# Patient Record
Sex: MALE | NOT HISPANIC OR LATINO | Employment: UNEMPLOYED | URBAN - METROPOLITAN AREA
[De-identification: names, ages, dates, MRNs, and addresses within clinical notes are randomized per-mention and may not be internally consistent; named-entity substitution may affect disease eponyms.]

---

## 2018-11-14 ENCOUNTER — HOSPITAL ENCOUNTER (EMERGENCY)
Facility: HOSPITAL | Age: 38
DRG: 134 | End: 2018-11-14
Attending: EMERGENCY MEDICINE
Payer: COMMERCIAL

## 2018-11-14 ENCOUNTER — APPOINTMENT (EMERGENCY)
Dept: CT IMAGING | Facility: HOSPITAL | Age: 38
DRG: 134 | End: 2018-11-14
Payer: COMMERCIAL

## 2018-11-14 ENCOUNTER — HOSPITAL ENCOUNTER (INPATIENT)
Facility: HOSPITAL | Age: 38
LOS: 1 days | Discharge: HOME | DRG: 134 | End: 2018-11-15
Attending: INTERNAL MEDICINE | Admitting: INTERNAL MEDICINE
Payer: COMMERCIAL

## 2018-11-14 VITALS
HEART RATE: 111 BPM | RESPIRATION RATE: 17 BRPM | TEMPERATURE: 98.9 F | SYSTOLIC BLOOD PRESSURE: 128 MMHG | BODY MASS INDEX: 25.69 KG/M2 | HEIGHT: 71 IN | WEIGHT: 183.5 LBS | DIASTOLIC BLOOD PRESSURE: 64 MMHG | OXYGEN SATURATION: 90 %

## 2018-11-14 DIAGNOSIS — I26.99 PULMONARY EMBOLISM (HCC): Primary | ICD-10-CM

## 2018-11-14 DIAGNOSIS — J18.9 PNEUMONIA OF RIGHT LOWER LOBE DUE TO INFECTIOUS ORGANISM: ICD-10-CM

## 2018-11-14 DIAGNOSIS — I26.99 OTHER ACUTE PULMONARY EMBOLISM WITHOUT ACUTE COR PULMONALE (HCC): ICD-10-CM

## 2018-11-14 DIAGNOSIS — I26.99 PULMONARY EMBOLI (HCC): Primary | ICD-10-CM

## 2018-11-14 PROBLEM — Z72.0 TOBACCO ABUSE: Chronic | Status: ACTIVE | Noted: 2018-11-14

## 2018-11-14 LAB
ALBUMIN SERPL BCP-MCNC: 5 G/DL (ref 3.5–5.7)
ALP SERPL-CCNC: 79 U/L (ref 40–150)
ALT SERPL W P-5'-P-CCNC: 22 U/L (ref 7–52)
ANION GAP SERPL CALCULATED.3IONS-SCNC: 7 MMOL/L (ref 4–13)
AST SERPL W P-5'-P-CCNC: 25 U/L (ref 13–39)
BASOPHILS # BLD AUTO: 0 THOUSANDS/ΜL (ref 0–0.1)
BASOPHILS NFR BLD AUTO: 0 % (ref 0–1)
BILIRUB SERPL-MCNC: 0.5 MG/DL (ref 0.2–1)
BILIRUB UR QL STRIP: NEGATIVE
BUN SERPL-MCNC: 7 MG/DL (ref 7–25)
CALCIUM SERPL-MCNC: 10 MG/DL (ref 8.6–10.5)
CHLORIDE SERPL-SCNC: 100 MMOL/L (ref 98–107)
CLARITY UR: CLEAR
CO2 SERPL-SCNC: 29 MMOL/L (ref 21–31)
COLOR UR: YELLOW
CREAT SERPL-MCNC: 1.04 MG/DL (ref 0.7–1.3)
EOSINOPHIL # BLD AUTO: 0.3 THOUSAND/ΜL (ref 0–0.61)
EOSINOPHIL NFR BLD AUTO: 2 % (ref 0–6)
ERYTHROCYTE [DISTWIDTH] IN BLOOD BY AUTOMATED COUNT: 14.1 % (ref 11.6–15.1)
GFR SERPL CREATININE-BSD FRML MDRD: 91 ML/MIN/1.73SQ M
GLUCOSE SERPL-MCNC: 97 MG/DL (ref 65–140)
GLUCOSE UR STRIP-MCNC: NEGATIVE MG/DL
HCT VFR BLD AUTO: 44.1 % (ref 42–52)
HGB BLD-MCNC: 14.6 G/DL (ref 12–17)
HGB UR QL STRIP.AUTO: NEGATIVE
KETONES UR STRIP-MCNC: ABNORMAL MG/DL
LACTATE SERPL-SCNC: 0.6 MMOL/L (ref 0.5–2)
LEUKOCYTE ESTERASE UR QL STRIP: NEGATIVE
LIPASE SERPL-CCNC: 37 U/L (ref 11–82)
LYMPHOCYTES # BLD AUTO: 2.3 THOUSANDS/ΜL (ref 0.6–4.47)
LYMPHOCYTES NFR BLD AUTO: 18 % (ref 14–44)
MCH RBC QN AUTO: 30.1 PG (ref 26.8–34.3)
MCHC RBC AUTO-ENTMCNC: 33.1 G/DL (ref 31.4–37.4)
MCV RBC AUTO: 91 FL (ref 82–98)
MONOCYTES # BLD AUTO: 1.1 THOUSAND/ΜL (ref 0.17–1.22)
MONOCYTES NFR BLD AUTO: 9 % (ref 4–12)
NEUTROPHILS # BLD AUTO: 8.8 THOUSANDS/ΜL (ref 1.85–7.62)
NEUTS SEG NFR BLD AUTO: 71 % (ref 43–75)
NITRITE UR QL STRIP: NEGATIVE
NRBC BLD AUTO-RTO: 0 /100 WBCS
PH UR STRIP.AUTO: 6.5 [PH] (ref 5–8)
PLATELET # BLD AUTO: 253 THOUSANDS/UL (ref 149–390)
PMV BLD AUTO: 8 FL (ref 8.9–12.7)
POTASSIUM SERPL-SCNC: 4.2 MMOL/L (ref 3.5–5.5)
PROT SERPL-MCNC: 7.9 G/DL (ref 6.4–8.9)
PROT UR STRIP-MCNC: NEGATIVE MG/DL
RBC # BLD AUTO: 4.85 MILLION/UL (ref 3.88–5.62)
SODIUM SERPL-SCNC: 136 MMOL/L (ref 134–143)
SP GR UR STRIP.AUTO: 1.01 (ref 1–1.03)
TROPONIN I SERPL-MCNC: <0.03 NG/ML
UROBILINOGEN UR QL STRIP.AUTO: 0.2 E.U./DL
WBC # BLD AUTO: 12.5 THOUSAND/UL (ref 4.31–10.16)

## 2018-11-14 PROCEDURE — 87040 BLOOD CULTURE FOR BACTERIA: CPT

## 2018-11-14 PROCEDURE — 36415 COLL VENOUS BLD VENIPUNCTURE: CPT

## 2018-11-14 PROCEDURE — 96376 TX/PRO/DX INJ SAME DRUG ADON: CPT

## 2018-11-14 PROCEDURE — 96361 HYDRATE IV INFUSION ADD-ON: CPT

## 2018-11-14 PROCEDURE — 84484 ASSAY OF TROPONIN QUANT: CPT | Performed by: EMERGENCY MEDICINE

## 2018-11-14 PROCEDURE — 85025 COMPLETE CBC W/AUTO DIFF WBC: CPT | Performed by: EMERGENCY MEDICINE

## 2018-11-14 PROCEDURE — 74176 CT ABD & PELVIS W/O CONTRAST: CPT

## 2018-11-14 PROCEDURE — 99223 1ST HOSP IP/OBS HIGH 75: CPT | Performed by: PHYSICIAN ASSISTANT

## 2018-11-14 PROCEDURE — 96372 THER/PROPH/DIAG INJ SC/IM: CPT

## 2018-11-14 PROCEDURE — 93005 ELECTROCARDIOGRAM TRACING: CPT

## 2018-11-14 PROCEDURE — 96365 THER/PROPH/DIAG IV INF INIT: CPT

## 2018-11-14 PROCEDURE — 80053 COMPREHEN METABOLIC PANEL: CPT | Performed by: EMERGENCY MEDICINE

## 2018-11-14 PROCEDURE — 83690 ASSAY OF LIPASE: CPT | Performed by: EMERGENCY MEDICINE

## 2018-11-14 PROCEDURE — 81003 URINALYSIS AUTO W/O SCOPE: CPT | Performed by: EMERGENCY MEDICINE

## 2018-11-14 PROCEDURE — 96367 TX/PROPH/DG ADDL SEQ IV INF: CPT

## 2018-11-14 PROCEDURE — 99285 EMERGENCY DEPT VISIT HI MDM: CPT

## 2018-11-14 PROCEDURE — 36415 COLL VENOUS BLD VENIPUNCTURE: CPT | Performed by: EMERGENCY MEDICINE

## 2018-11-14 PROCEDURE — 96375 TX/PRO/DX INJ NEW DRUG ADDON: CPT

## 2018-11-14 PROCEDURE — 83605 ASSAY OF LACTIC ACID: CPT

## 2018-11-14 PROCEDURE — 71275 CT ANGIOGRAPHY CHEST: CPT

## 2018-11-14 RX ORDER — FENTANYL CITRATE 50 UG/ML
50 INJECTION, SOLUTION INTRAMUSCULAR; INTRAVENOUS ONCE
Status: COMPLETED | OUTPATIENT
Start: 2018-11-14 | End: 2018-11-14

## 2018-11-14 RX ORDER — OLANZAPINE 5 MG/1
5 TABLET ORAL
COMMUNITY

## 2018-11-14 RX ORDER — NICOTINE 21 MG/24HR
21 PATCH, TRANSDERMAL 24 HOURS TRANSDERMAL
Status: DISCONTINUED | OUTPATIENT
Start: 2018-11-14 | End: 2018-11-15 | Stop reason: HOSPADM

## 2018-11-14 RX ORDER — 0.9 % SODIUM CHLORIDE 0.9 %
3 VIAL (ML) INJECTION AS NEEDED
Status: DISCONTINUED | OUTPATIENT
Start: 2018-11-14 | End: 2018-11-14 | Stop reason: HOSPADM

## 2018-11-14 RX ORDER — TRAZODONE HYDROCHLORIDE 50 MG/1
50 TABLET ORAL
COMMUNITY

## 2018-11-14 RX ORDER — HYDROMORPHONE HCL/PF 1 MG/ML
1 SYRINGE (ML) INJECTION
Status: DISCONTINUED | OUTPATIENT
Start: 2018-11-14 | End: 2018-11-15

## 2018-11-14 RX ORDER — TRAZODONE HYDROCHLORIDE 50 MG/1
50 TABLET ORAL
Status: DISCONTINUED | OUTPATIENT
Start: 2018-11-14 | End: 2018-11-15 | Stop reason: HOSPADM

## 2018-11-14 RX ORDER — OXYCODONE HYDROCHLORIDE AND ACETAMINOPHEN 5; 325 MG/1; MG/1
2 TABLET ORAL EVERY 4 HOURS PRN
Status: DISCONTINUED | OUTPATIENT
Start: 2018-11-14 | End: 2018-11-15 | Stop reason: HOSPADM

## 2018-11-14 RX ORDER — ONDANSETRON 2 MG/ML
4 INJECTION INTRAMUSCULAR; INTRAVENOUS EVERY 6 HOURS PRN
Status: DISCONTINUED | OUTPATIENT
Start: 2018-11-14 | End: 2018-11-15 | Stop reason: HOSPADM

## 2018-11-14 RX ORDER — ACETAMINOPHEN 325 MG/1
650 TABLET ORAL EVERY 4 HOURS PRN
Status: DISCONTINUED | OUTPATIENT
Start: 2018-11-14 | End: 2018-11-15 | Stop reason: HOSPADM

## 2018-11-14 RX ORDER — OLANZAPINE 5 MG/1
5 TABLET ORAL
Status: DISCONTINUED | OUTPATIENT
Start: 2018-11-14 | End: 2018-11-15 | Stop reason: HOSPADM

## 2018-11-14 RX ORDER — ASPIRIN 81 MG/1
324 TABLET, CHEWABLE ORAL ONCE
Status: COMPLETED | OUTPATIENT
Start: 2018-11-14 | End: 2018-11-14

## 2018-11-14 RX ORDER — HYDROMORPHONE HCL/PF 1 MG/ML
1 SYRINGE (ML) INJECTION ONCE
Status: COMPLETED | OUTPATIENT
Start: 2018-11-14 | End: 2018-11-14

## 2018-11-14 RX ORDER — NICOTINE 21 MG/24HR
1 PATCH, TRANSDERMAL 24 HOURS TRANSDERMAL DAILY
Status: DISCONTINUED | OUTPATIENT
Start: 2018-11-15 | End: 2018-11-14

## 2018-11-14 RX ORDER — ONDANSETRON 2 MG/ML
4 INJECTION INTRAMUSCULAR; INTRAVENOUS ONCE
Status: COMPLETED | OUTPATIENT
Start: 2018-11-14 | End: 2018-11-14

## 2018-11-14 RX ADMIN — ONDANSETRON 4 MG: 2 INJECTION INTRAMUSCULAR; INTRAVENOUS at 17:37

## 2018-11-14 RX ADMIN — FENTANYL CITRATE 50 MCG: 50 INJECTION INTRAMUSCULAR; INTRAVENOUS at 19:20

## 2018-11-14 RX ADMIN — ENOXAPARIN SODIUM 80 MG: 100 INJECTION SUBCUTANEOUS at 20:12

## 2018-11-14 RX ADMIN — ASPIRIN 81 MG 324 MG: 81 TABLET ORAL at 17:17

## 2018-11-14 RX ADMIN — HYDROMORPHONE HYDROCHLORIDE 1 MG: 1 INJECTION, SOLUTION INTRAMUSCULAR; INTRAVENOUS; SUBCUTANEOUS at 21:23

## 2018-11-14 RX ADMIN — MORPHINE SULFATE 2 MG: 2 INJECTION, SOLUTION INTRAMUSCULAR; INTRAVENOUS at 17:19

## 2018-11-14 RX ADMIN — SODIUM CHLORIDE 1000 ML: 0.9 INJECTION, SOLUTION INTRAVENOUS at 17:37

## 2018-11-14 RX ADMIN — IOHEXOL 80 ML: 350 INJECTION, SOLUTION INTRAVENOUS at 18:49

## 2018-11-14 RX ADMIN — AZITHROMYCIN MONOHYDRATE 500 MG: 500 INJECTION, POWDER, LYOPHILIZED, FOR SOLUTION INTRAVENOUS at 20:56

## 2018-11-14 RX ADMIN — HYDROMORPHONE HYDROCHLORIDE 1 MG: 1 INJECTION, SOLUTION INTRAMUSCULAR; INTRAVENOUS; SUBCUTANEOUS at 17:37

## 2018-11-14 RX ADMIN — MORPHINE SULFATE 2 MG: 2 INJECTION, SOLUTION INTRAMUSCULAR; INTRAVENOUS at 22:56

## 2018-11-14 RX ADMIN — CEFTRIAXONE 1000 MG: 1 INJECTION, SOLUTION INTRAVENOUS at 20:12

## 2018-11-14 NOTE — ED NOTES
Patient resting quietly upright at his request due to position of comfort - patient made aware awaiting pending CT results     Sil Chinchilla RN  11/14/18 3450

## 2018-11-14 NOTE — ED PROVIDER NOTES
History  Chief Complaint   Patient presents with    Fever - 9 weeks to 74 years     last night     Flank Pain     started last night in the right flank, radiating into the right side of the chest       Patient reports to the emergency department with complaint of severe right-sided pain from the flank down to the right anterior chest and into the right abdomen  Patient's pain is greater than a 10/10 per patient  Patient had a fever starting yesterday intermittently and side pain starting late last night  History provided by:  Patient      Prior to Admission Medications   Prescriptions Last Dose Informant Patient Reported? Taking? OLANZapine (ZyPREXA) 5 mg tablet   Yes Yes   Sig: Take 5 mg by mouth daily at bedtime   traZODone (DESYREL) 100 mg tablet   Yes Yes   Sig: Take 50 mg by mouth daily at bedtime      Facility-Administered Medications: None       No past medical history on file  Past Surgical History:   Procedure Laterality Date    FACIAL RECONSTRUCTION SURGERY         No family history on file  I have reviewed and agree with the history as documented  Social History   Substance Use Topics    Smoking status: Current Every Day Smoker     Packs/day: 0 50    Smokeless tobacco: Never Used    Alcohol use No        Review of Systems   Constitutional: Negative for chills and fever  HENT: Negative for rhinorrhea and sore throat  Eyes: Negative for visual disturbance  Respiratory: Positive for chest tightness  Negative for cough and shortness of breath  Cardiovascular: Positive for chest pain  Negative for leg swelling  Gastrointestinal: Positive for abdominal pain  Negative for diarrhea, nausea and vomiting  Genitourinary: Negative for dysuria  Musculoskeletal: Negative for back pain and myalgias  Skin: Negative for rash  Neurological: Negative for dizziness and headaches  Psychiatric/Behavioral: Negative for confusion     All other systems reviewed and are negative  Physical Exam  Physical Exam   Constitutional: He is oriented to person, place, and time  He appears well-developed and well-nourished  HENT:   Nose: Nose normal    Mouth/Throat: Oropharynx is clear and moist  No oropharyngeal exudate  Eyes: Pupils are equal, round, and reactive to light  Conjunctivae and EOM are normal  No scleral icterus  Neck: Normal range of motion  Neck supple  No JVD present  No tracheal deviation present  Cardiovascular: Normal rate, regular rhythm and normal heart sounds  No murmur heard  Pulmonary/Chest: Breath sounds normal  No respiratory distress  He has no wheezes  He has no rales  He exhibits tenderness  Patient taking quick and shallow breaths due to severe right sided pain with deep inspiration   Abdominal: Soft  Bowel sounds are normal  He exhibits no distension and no mass  There is tenderness  There is guarding  There is no rebound  Hypoactive bowel sounds: tenderness is to the right sided upper abdomen greater than right sided lower abdomen  Musculoskeletal: Normal range of motion  He exhibits no edema or tenderness  Chest wall tender with palpation on the right anterior and posterior  Neurological: He is alert and oriented to person, place, and time  No cranial nerve deficit or sensory deficit  He exhibits normal muscle tone  5/5 motor, nl sens   Skin: Skin is warm and dry  No rash noted  No erythema  No pallor  No dermatomal rash consistent with shingles   Psychiatric: He has a normal mood and affect  His behavior is normal    Nursing note and vitals reviewed        Vital Signs  ED Triage Vitals [11/14/18 1655]   Temperature Pulse Respirations Blood Pressure SpO2   99 4 °F (37 4 °C) 92 16 109/72 100 %      Temp Source Heart Rate Source Patient Position - Orthostatic VS BP Location FiO2 (%)   Tympanic Monitor Sitting Left arm --      Pain Score       Worst Possible Pain           Vitals:    11/14/18 1655 11/14/18 1757   BP: 109/72 123/73 Pulse: 92 98   Patient Position - Orthostatic VS: Sitting        Visual Acuity      ED Medications  Medications   sodium chloride (PF) 0 9 % injection 3 mL (not administered)   sodium chloride 0 9 % bolus 1,000 mL (1,000 mL Intravenous New Bag 11/14/18 1737)   iohexol (OMNIPAQUE) 350 MG/ML injection (SINGLE-DOSE) 80 mL (not administered)   morphine injection 2 mg (2 mg Intravenous Given 11/14/18 1719)   aspirin chewable tablet 324 mg (324 mg Oral Given 11/14/18 1717)   HYDROmorphone (DILAUDID) injection 1 mg (1 mg Intravenous Given 11/14/18 1737)   ondansetron (ZOFRAN) injection 4 mg (4 mg Intravenous Given 11/14/18 1737)       Diagnostic Studies  Results Reviewed     Procedure Component Value Units Date/Time    CMP [[id number]] Collected:  11/14/18 1712    Lab Status:  Final result Specimen:  Blood from Arm, Left Updated:  11/14/18 1748     Sodium 136 mmol/L      Potassium 4 2 mmol/L      Chloride 100 mmol/L      CO2 29 mmol/L      ANION GAP 7 mmol/L      BUN 7 mg/dL      Creatinine 1 04 mg/dL      Glucose 97 mg/dL      Calcium 10 0 mg/dL      AST 25 U/L      ALT 22 U/L      Alkaline Phosphatase 79 U/L      Total Protein 7 9 g/dL      Albumin 5 0 g/dL      Total Bilirubin 0 50 mg/dL      eGFR 91 ml/min/1 73sq m     Narrative:         National Kidney Disease Education Program recommendations are as follows:  GFR calculation is accurate only with a steady state creatinine  Chronic Kidney disease less than 60 ml/min/1 73 sq  meters  Kidney failure less than 15 ml/min/1 73 sq  meters      Lipase [[id number]]  (Normal) Collected:  11/14/18 1712    Lab Status:  Final result Specimen:  Blood from Arm, Left Updated:  11/14/18 1748     Lipase 37 u/L     Troponin I [[id number]]  (Normal) Collected:  11/14/18 1712    Lab Status:  Final result Specimen:  Blood from Arm, Left Updated:  11/14/18 1748     Troponin I <0 03 ng/mL     CBC and differential [[id number]]  (Abnormal) Collected:  11/14/18 1712    Lab Status:  Final result Specimen:  Blood from Arm, Left Updated:  11/14/18 1733     WBC 12 50 (H) Thousand/uL      RBC 4 85 Million/uL      Hemoglobin 14 6 g/dL      Hematocrit 44 1 %      MCV 91 fL      MCH 30 1 pg      MCHC 33 1 g/dL      RDW 14 1 %      MPV 8 0 (L) fL      Platelets 089 Thousands/uL      nRBC 0 /100 WBCs      Neutrophils Relative 71 %      Lymphocytes Relative 18 %      Monocytes Relative 9 %      Eosinophils Relative 2 %      Basophils Relative 0 %      Neutrophils Absolute 8 80 (H) Thousands/µL      Lymphocytes Absolute 2 30 Thousands/µL      Monocytes Absolute 1 10 Thousand/µL      Eosinophils Absolute 0 30 Thousand/µL      Basophils Absolute 0 00 Thousands/µL     UA w Reflex to Microscopic w Reflex to Culture [[id number]]  (Abnormal) Collected:  11/14/18 1712    Lab Status:  Final result Specimen:  Urine from Urine, Clean Catch Updated:  11/14/18 1725     Color, UA Yellow     Clarity, UA Clear     Specific Gravity, UA 1 015     pH, UA 6 5     Leukocytes, UA Negative     Nitrite, UA Negative     Protein, UA Negative mg/dl      Glucose, UA Negative mg/dl      Ketones, UA Trace (A) mg/dl      Urobilinogen, UA 0 2 E U /dl      Bilirubin, UA Negative     Blood, UA Negative                 CT renal stone study abdomen pelvis without contrast    (Results Pending)   CTA chest pe study    (Results Pending)              Procedures  Procedures       Phone Contacts  ED Phone Contact    ED Course  ED Course as of Nov 14 1849 Wed Nov 14, 2018   1719 Normal sinus rhythm with a rate of 85 beats per minute, normal axis, normal conduction, normal ST-T segments, no STEMI seen  ECG 12 lead   1841 CT images of the chest reviewed by me  No pneumothorax seen  We await radiologist's reading  Dr Yvonne Donovan will follow patient  Patient discussed with Dr Yvonne Donovan                                  [facility] Time    Disposition  Final diagnoses:   None     ED Disposition     None      Follow-up Information    None         Patient's Medications Discharge Prescriptions    No medications on file     No discharge procedures on file      ED Provider  Electronically Signed by           Cassie Valenzuela, DO  11/14/18 6576

## 2018-11-14 NOTE — ED PROVIDER NOTES
History  Chief Complaint   Patient presents with    Fever - 9 weeks to 74 years     last night     Flank Pain     started last night in the right flank, radiating into the right side of the chest       I took over the care of [facility] hospitals from Dr Manuel Abarca  Patient is a 55-year-old male who came to the emergency department due to pain on right chest radiating to the upper back and the right upper quadrant area accompanied by low-grade the fever today  The patient denies cough, but has shortness of breath  Patient admits smoking  History provided by:  Patient   used: No    Fever - 9 weeks to 74 years   Temp source:  Unable to specify  Severity:  Mild  Onset quality:  Gradual  Duration:  1 day  Timing:  Constant  Progression:  Worsening  Chronicity:  New  Relieved by:  Nothing  Worsened by:  Nothing  Ineffective treatments:  None tried  Associated symptoms: chest pain    Associated symptoms: no chills, no confusion, no congestion, no cough, no dysuria, no ear pain, no headaches, no myalgias, no nausea, no rash, no rhinorrhea, no somnolence, no sore throat and no vomiting    Chest pain:     Quality: aching      Severity:  Moderate    Onset quality:  Gradual    Duration:  1 day    Timing:  Constant    Progression:  Worsening    Chronicity:  New  Risk factors: no contaminated food, no contaminated water, no hx of cancer, no immunosuppression, no occupational exposure, no recent sickness, no recent travel and no sick contacts        Prior to Admission Medications   Prescriptions Last Dose Informant Patient Reported? Taking? OLANZapine (ZyPREXA) 5 mg tablet   Yes Yes   Sig: Take 5 mg by mouth daily at bedtime   traZODone (DESYREL) 100 mg tablet   Yes Yes   Sig: Take 50 mg by mouth daily at bedtime      Facility-Administered Medications: None       No past medical history on file      Past Surgical History:   Procedure Laterality Date    FACIAL RECONSTRUCTION SURGERY         No family history on file  I have reviewed and agree with the history as documented  Social History   Substance Use Topics    Smoking status: Current Every Day Smoker     Packs/day: 0 50    Smokeless tobacco: Never Used    Alcohol use No        Review of Systems   Constitutional: Positive for fever  Negative for chills  HENT: Negative for congestion, ear pain, rhinorrhea and sore throat  Eyes: Negative  Respiratory: Negative for cough  Cardiovascular: Positive for chest pain  Negative for palpitations and leg swelling  Gastrointestinal: Positive for abdominal pain  Negative for nausea and vomiting  Endocrine: Negative  Genitourinary: Negative  Negative for dysuria  Musculoskeletal: Negative for myalgias  Skin: Negative for rash  Allergic/Immunologic: Negative  Neurological: Negative for headaches  Hematological: Negative  Psychiatric/Behavioral: Negative for confusion  Physical Exam  Physical Exam   Constitutional: He is oriented to person, place, and time  He appears well-developed and well-nourished  No distress  HENT:   Head: Normocephalic and atraumatic  Right Ear: External ear normal    Left Ear: External ear normal    Nose: Nose normal    Mouth/Throat: Oropharynx is clear and moist  No oropharyngeal exudate  Eyes: Pupils are equal, round, and reactive to light  Conjunctivae and EOM are normal  Right eye exhibits no discharge  Left eye exhibits no discharge  No scleral icterus  Neck: Normal range of motion  Neck supple  No tracheal deviation present  No thyromegaly present  Cardiovascular: Normal rate, regular rhythm, normal heart sounds and intact distal pulses  Pulmonary/Chest: Effort normal and breath sounds normal  No respiratory distress  Abdominal: Soft  Bowel sounds are normal  He exhibits no distension  There is no tenderness  Musculoskeletal: Normal range of motion  He exhibits no edema, tenderness or deformity     Lymphadenopathy:     He has no cervical adenopathy  Neurological: He is alert and oriented to person, place, and time  No cranial nerve deficit  He exhibits normal muscle tone  Coordination normal    Skin: Skin is warm and dry  No rash noted  He is not diaphoretic  No erythema  No pallor  Psychiatric: He has a normal mood and affect  His behavior is normal  Judgment and thought content normal    Nursing note and vitals reviewed  Vital Signs  ED Triage Vitals [11/14/18 1655]   Temperature Pulse Respirations Blood Pressure SpO2   99 4 °F (37 4 °C) 92 16 109/72 100 %      Temp Source Heart Rate Source Patient Position - Orthostatic VS BP Location FiO2 (%)   Tympanic Monitor Sitting Left arm --      Pain Score       Worst Possible Pain           Vitals:    11/14/18 1655 11/14/18 1757   BP: 109/72 123/73   Pulse: 92 98   Patient Position - Orthostatic VS: Sitting        Visual Acuity      ED Medications  Medications   sodium chloride (PF) 0 9 % injection 3 mL (not administered)   enoxaparin (LOVENOX) subcutaneous injection 80 mg (not administered)   cefTRIAXone (ROCEPHIN) IVPB (premix) 1,000 mg (not administered)   azithromycin (ZITHROMAX) 500 mg in sodium chloride 0 9% 250mL IVPB 500 mg (not administered)   morphine injection 2 mg (2 mg Intravenous Given 11/14/18 1719)   aspirin chewable tablet 324 mg (324 mg Oral Given 11/14/18 1717)   HYDROmorphone (DILAUDID) injection 1 mg (1 mg Intravenous Given 11/14/18 1737)   ondansetron (ZOFRAN) injection 4 mg (4 mg Intravenous Given 11/14/18 1737)   sodium chloride 0 9 % bolus 1,000 mL (1,000 mL Intravenous New Bag 11/14/18 1737)   iohexol (OMNIPAQUE) 350 MG/ML injection (SINGLE-DOSE) 80 mL (80 mL Intravenous Given 11/14/18 1849)   fentanyl citrate (PF) 100 MCG/2ML 50 mcg (50 mcg Intravenous Given 11/14/18 1920)       Diagnostic Studies  Results Reviewed     Procedure Component Value Units Date/Time    Lactic acid, plasma [[id number]] Collected:  11/14/18 1954    Lab Status:   In process Specimen: Blood from Arm, Left Updated:  11/14/18 2000    Blood culture #1 [[id number]] Collected:  11/14/18 1953    Lab Status: In process Specimen:  Blood from Arm, Right Updated:  11/14/18 2000    Blood culture #2 [[id number]] Collected:  11/14/18 1954    Lab Status: In process Specimen:  Blood from Arm, Left Updated:  11/14/18 2000    Lactic acid, plasma [[id number]]     Lab Status:  No result Specimen:  Blood     CMP [[id number]] Collected:  11/14/18 1712    Lab Status:  Final result Specimen:  Blood from Arm, Left Updated:  11/14/18 1748     Sodium 136 mmol/L      Potassium 4 2 mmol/L      Chloride 100 mmol/L      CO2 29 mmol/L      ANION GAP 7 mmol/L      BUN 7 mg/dL      Creatinine 1 04 mg/dL      Glucose 97 mg/dL      Calcium 10 0 mg/dL      AST 25 U/L      ALT 22 U/L      Alkaline Phosphatase 79 U/L      Total Protein 7 9 g/dL      Albumin 5 0 g/dL      Total Bilirubin 0 50 mg/dL      eGFR 91 ml/min/1 73sq m     Narrative:         National Kidney Disease Education Program recommendations are as follows:  GFR calculation is accurate only with a steady state creatinine  Chronic Kidney disease less than 60 ml/min/1 73 sq  meters  Kidney failure less than 15 ml/min/1 73 sq  meters      Lipase [[id number]]  (Normal) Collected:  11/14/18 1712    Lab Status:  Final result Specimen:  Blood from Arm, Left Updated:  11/14/18 1748     Lipase 37 u/L     Troponin I [[id number]]  (Normal) Collected:  11/14/18 1712    Lab Status:  Final result Specimen:  Blood from Arm, Left Updated:  11/14/18 1748     Troponin I <0 03 ng/mL     CBC and differential [[id number]]  (Abnormal) Collected:  11/14/18 1712    Lab Status:  Final result Specimen:  Blood from Arm, Left Updated:  11/14/18 1733     WBC 12 50 (H) Thousand/uL      RBC 4 85 Million/uL      Hemoglobin 14 6 g/dL      Hematocrit 44 1 %      MCV 91 fL      MCH 30 1 pg      MCHC 33 1 g/dL      RDW 14 1 %      MPV 8 0 (L) fL      Platelets 765 Thousands/uL      nRBC 0 /100 WBCs Neutrophils Relative 71 %      Lymphocytes Relative 18 %      Monocytes Relative 9 %      Eosinophils Relative 2 %      Basophils Relative 0 %      Neutrophils Absolute 8 80 (H) Thousands/µL      Lymphocytes Absolute 2 30 Thousands/µL      Monocytes Absolute 1 10 Thousand/µL      Eosinophils Absolute 0 30 Thousand/µL      Basophils Absolute 0 00 Thousands/µL     UA w Reflex to Microscopic w Reflex to Culture [[id number]]  (Abnormal) Collected:  11/14/18 1712    Lab Status:  Final result Specimen:  Urine from Urine, Clean Catch Updated:  11/14/18 1725     Color, UA Yellow     Clarity, UA Clear     Specific Gravity, UA 1 015     pH, UA 6 5     Leukocytes, UA Negative     Nitrite, UA Negative     Protein, UA Negative mg/dl      Glucose, UA Negative mg/dl      Ketones, UA Trace (A) mg/dl      Urobilinogen, UA 0 2 E U /dl      Bilirubin, UA Negative     Blood, UA Negative                 CTA chest pe study   Final Result by María Santiago (11/14 1934)   1  Acute pulmonary emboli most pronounced within the lobar and segmental   pulmonary arteries supplying the right lower lobe with suspected small   pulmonary emboli also within the left lower lobe  No convincing CT   evidence of right heart strain  2   Right lower lobe consolidation, which may be infectious/inflammatory   in etiology as this consolidation lacks the typical peripheral   triangular-shaped consolidation often seen with pulmonary infarctions  3   Trace right pleural effusion  4   No evidence of acute inflammatory process in the abdomen or pelvis on   this noncontrast exam   No nephrolithiasis, hydronephrosis, or visualized   ureteral stone  NOTIFICATION:  The critical results of the study were discussed with, and   acknowledged by Dr Samantha Mirza by telephone on 11/14/2018 at 19:25                                   Signed by María Santiago MD      CT renal stone study abdomen pelvis without contrast   Final Result by María Santiago (11/14 1934)   1  Acute pulmonary emboli most pronounced within the lobar and segmental   pulmonary arteries supplying the right lower lobe with suspected small   pulmonary emboli also within the left lower lobe  No convincing CT   evidence of right heart strain  2   Right lower lobe consolidation, which may be infectious/inflammatory   in etiology as this consolidation lacks the typical peripheral   triangular-shaped consolidation often seen with pulmonary infarctions  3   Trace right pleural effusion  4   No evidence of acute inflammatory process in the abdomen or pelvis on   this noncontrast exam   No nephrolithiasis, hydronephrosis, or visualized   ureteral stone  NOTIFICATION:  The critical results of the study were discussed with, and   acknowledged by Dr Daniel Vora by telephone on 11/14/2018 at 19:25  Signed by Veronica Linares MD                 Procedures  ECG 12 Lead Documentation  Date/Time: 11/14/2018 6:59 PM  Performed by: Dewayne Hastings  Authorized by: Juan Manuel GLOVER     Indications / Diagnosis:  Fever chest pain  ECG reviewed by me, the ED Provider: yes    Patient location:  ED  Previous ECG:     Previous ECG:  Unavailable    Comparison to cardiac monitor: Yes    Interpretation:     Interpretation: normal    Rate:     ECG rate:  85 beats per minute    ECG rate assessment: normal    Rhythm:     Rhythm: sinus rhythm    Ectopy:     Ectopy: none    QRS:     QRS axis:  Normal    QRS intervals:  Normal  Conduction:     Conduction: normal    ST segments:     ST segments:  Normal  T waves:     T waves: normal             Phone Contacts  ED Phone Contact    ED Course  ED Course as of Nov 14 2004 Wed Nov 14, 2018 1930 Patient is resting comfortably on the stretcher  He remains alert and oriented to time, place and person  He is not in any distress    I have discussed with him the results of his CT angio of the chest which showed pulmonary embolism and the possible pneumonia  I have advised him that he will be admitted in the hospital and he agreed  1951 Case was discussed with Dr Driss Novoa, Hospitalist on call about plan for admission and he agreed  MDM  Number of Diagnoses or Management Options  Pneumonia of right lower lobe due to infectious organism [facility]): new and requires workup  Pulmonary embolism [facility]): new and requires workup     Amount and/or Complexity of Data Reviewed  Clinical lab tests: ordered and reviewed  Tests in the radiology section of CPT®: ordered and reviewed  Tests in the medicine section of CPT®: ordered and reviewed  Discussion of test results with the performing providers: yes  Decide to obtain previous medical records or to obtain history from someone other than the patient: yes  Review and summarize past medical records: yes  Discuss the patient with other providers: yes  Independent visualization of images, tracings, or specimens: yes    Risk of Complications, Morbidity, and/or Mortality  Presenting problems: moderate  Diagnostic procedures: moderate  Management options: moderate    Patient Progress  Patient progress: stable    CritCare Time    Disposition  Final diagnoses:   Pulmonary embolism ([facility] [address] )   Pneumonia of right lower lobe due to infectious organism [facility])     Time reflects when diagnosis was documented in both MDM as applicable and the Disposition within this note     Time User Action Codes Description Comment    11/14/2018  7:53 PM Iva Richardson Add [I26 99] Pulmonary embolism ([facility] [address] )     11/14/2018  7:53 PM Jamal Eller Add [J18 1] Pneumonia of right lower lobe due to infectious organism [facility])       ED Disposition     ED Disposition Condition Comment    Transfer to Another [address] should be transferred out to Av Hyde MD Documentation      Most Recent Value   Patient Condition  An emergency transfer is being made prior to stabilization due to the need for definitive care and the benefit of transfer outweighs the risk, Other (Include comment)_________________________________________   Reason for Transfer  -- [Direct admission]   Benefits of Transfer  Continuity of care   Risks of Transfer  Potential for delay in receiving treatment, Potential deterioration of medical condition, Loss of IV, Increased discomfort during transfer, Possible worsening of condition or death during transfer   Accepting Physician  Dr Naom Grant Name, [facility]  Provider Certification  General risk, such as traffic hazards, adverse weather conditions, rough terrain or turbulence, possible failure of equipment (including vehicle or aircraft), or consequences of actions of persons outside the control of the transport personnel, Unanticipated needs of medical equipment and personnel during transport, Risk of worsening condition, The possibility of a transport vehicle being unavailable      RN Documentation      Most [facility] Name, [address] [place], [place]   Transport Mode  Ambulance   Level of Care  Advanced life support   Copies of Medical Records Sent  History and Physical   Patient Belongings Disposition  Sent with patient      Follow-up Information    None         Patient's Medications   Discharge Prescriptions    No medications on file     No discharge procedures on file      ED Provider  Electronically Signed by           Praveen Mcdonough MD  11/14/18 2030

## 2018-11-14 NOTE — ED NOTES
[facility]     Sherman Sanchez RN  11/14/18 5764

## 2018-11-15 ENCOUNTER — APPOINTMENT (INPATIENT)
Dept: NON INVASIVE DIAGNOSTICS | Facility: HOSPITAL | Age: 38
DRG: 134 | End: 2018-11-15
Payer: COMMERCIAL

## 2018-11-15 ENCOUNTER — HOSPITAL ENCOUNTER (INPATIENT)
Facility: HOSPITAL | Age: 38
LOS: 3 days | Discharge: HOME | DRG: 134 | End: 2018-11-18
Attending: EMERGENCY MEDICINE | Admitting: INTERNAL MEDICINE
Payer: COMMERCIAL

## 2018-11-15 VITALS
WEIGHT: 183 LBS | HEART RATE: 94 BPM | TEMPERATURE: 97.1 F | RESPIRATION RATE: 18 BRPM | SYSTOLIC BLOOD PRESSURE: 109 MMHG | HEIGHT: 71 IN | BODY MASS INDEX: 25.62 KG/M2 | OXYGEN SATURATION: 95 % | DIASTOLIC BLOOD PRESSURE: 65 MMHG

## 2018-11-15 DIAGNOSIS — R09.02 HYPOXIA: ICD-10-CM

## 2018-11-15 DIAGNOSIS — J18.9 PNEUMONIA OF RIGHT LOWER LOBE DUE TO INFECTIOUS ORGANISM: ICD-10-CM

## 2018-11-15 DIAGNOSIS — I26.99 OTHER ACUTE PULMONARY EMBOLISM WITHOUT ACUTE COR PULMONALE (HCC): ICD-10-CM

## 2018-11-15 DIAGNOSIS — I26.99 PULMONARY EMBOLUS (HCC): Primary | ICD-10-CM

## 2018-11-15 PROBLEM — R06.89 ACUTE RESPIRATORY INSUFFICIENCY: Status: ACTIVE | Noted: 2018-11-15

## 2018-11-15 PROBLEM — A41.9 SEPSIS (HCC): Status: ACTIVE | Noted: 2018-11-15

## 2018-11-15 LAB
ANION GAP SERPL CALCULATED.3IONS-SCNC: 7 MMOL/L (ref 4–13)
ATRIAL RATE: 85 BPM
BUN SERPL-MCNC: 8 MG/DL (ref 7–25)
CALCIUM SERPL-MCNC: 9.7 MG/DL (ref 8.6–10.5)
CHLORIDE SERPL-SCNC: 101 MMOL/L (ref 98–107)
CO2 SERPL-SCNC: 30 MMOL/L (ref 21–31)
CREAT SERPL-MCNC: 1.09 MG/DL (ref 0.7–1.3)
ERYTHROCYTE [DISTWIDTH] IN BLOOD BY AUTOMATED COUNT: 13.8 % (ref 11.5–14.5)
GFR SERPL CREATININE-BSD FRML MDRD: 86 ML/MIN/1.73SQ M
GLUCOSE SERPL-MCNC: 117 MG/DL (ref 65–99)
HCT VFR BLD AUTO: 43.4 % (ref 36.5–49.3)
HGB BLD-MCNC: 14.2 G/DL (ref 14–18)
MCH RBC QN AUTO: 29.9 PG (ref 26–34)
MCHC RBC AUTO-ENTMCNC: 32.7 G/DL (ref 31–37)
MCV RBC AUTO: 92 FL (ref 81–99)
P AXIS: 36 DEGREES
PLATELET # BLD AUTO: 252 THOUSANDS/UL (ref 149–390)
PMV BLD AUTO: 8.4 FL (ref 8.6–11.7)
POTASSIUM SERPL-SCNC: 3.9 MMOL/L (ref 3.5–5.5)
PR INTERVAL: 142 MS
QRS AXIS: 19 DEGREES
QRSD INTERVAL: 82 MS
QT INTERVAL: 348 MS
QTC INTERVAL: 414 MS
RBC # BLD AUTO: 4.74 MILLION/UL (ref 4.3–5.9)
SODIUM SERPL-SCNC: 138 MMOL/L (ref 134–143)
T WAVE AXIS: 45 DEGREES
VENTRICULAR RATE: 85 BPM
WBC # BLD AUTO: 12.9 THOUSAND/UL (ref 4.8–10.8)

## 2018-11-15 PROCEDURE — 99223 1ST HOSP IP/OBS HIGH 75: CPT | Performed by: PHYSICIAN ASSISTANT

## 2018-11-15 PROCEDURE — 99285 EMERGENCY DEPT VISIT HI MDM: CPT

## 2018-11-15 PROCEDURE — 87449 NOS EACH ORGANISM AG IA: CPT | Performed by: PHYSICIAN ASSISTANT

## 2018-11-15 PROCEDURE — 93005 ELECTROCARDIOGRAM TRACING: CPT

## 2018-11-15 PROCEDURE — 90686 IIV4 VACC NO PRSV 0.5 ML IM: CPT | Performed by: INTERNAL MEDICINE

## 2018-11-15 PROCEDURE — 80048 BASIC METABOLIC PNL TOTAL CA: CPT | Performed by: PHYSICIAN ASSISTANT

## 2018-11-15 PROCEDURE — 85027 COMPLETE CBC AUTOMATED: CPT | Performed by: PHYSICIAN ASSISTANT

## 2018-11-15 PROCEDURE — 96372 THER/PROPH/DIAG INJ SC/IM: CPT

## 2018-11-15 PROCEDURE — 93010 ELECTROCARDIOGRAM REPORT: CPT | Performed by: INTERNAL MEDICINE

## 2018-11-15 RX ORDER — HYDROMORPHONE HCL/PF 1 MG/ML
0.5 SYRINGE (ML) INJECTION ONCE
Status: COMPLETED | OUTPATIENT
Start: 2018-11-15 | End: 2018-11-15

## 2018-11-15 RX ORDER — LORAZEPAM 2 MG/ML
1 INJECTION INTRAMUSCULAR EVERY 6 HOURS PRN
Status: DISCONTINUED | OUTPATIENT
Start: 2018-11-15 | End: 2018-11-18 | Stop reason: HOSPADM

## 2018-11-15 RX ORDER — TRAZODONE HYDROCHLORIDE 50 MG/1
50 TABLET ORAL
Status: DISCONTINUED | OUTPATIENT
Start: 2018-11-15 | End: 2018-11-18 | Stop reason: HOSPADM

## 2018-11-15 RX ORDER — OXYCODONE HYDROCHLORIDE AND ACETAMINOPHEN 5; 325 MG/1; MG/1
1 TABLET ORAL ONCE
Status: DISCONTINUED | OUTPATIENT
Start: 2018-11-15 | End: 2018-11-15

## 2018-11-15 RX ORDER — KETOROLAC TROMETHAMINE 30 MG/ML
30 INJECTION, SOLUTION INTRAMUSCULAR; INTRAVENOUS EVERY 6 HOURS SCHEDULED
Status: DISCONTINUED | OUTPATIENT
Start: 2018-11-15 | End: 2018-11-15

## 2018-11-15 RX ORDER — SODIUM CHLORIDE 9 MG/ML
250 INJECTION, SOLUTION INTRAVENOUS CONTINUOUS
Status: DISCONTINUED | OUTPATIENT
Start: 2018-11-15 | End: 2018-11-17

## 2018-11-15 RX ORDER — OXYCODONE HYDROCHLORIDE AND ACETAMINOPHEN 5; 325 MG/1; MG/1
1 TABLET ORAL EVERY 8 HOURS PRN
Qty: 15 TABLET | Refills: 0 | Status: SHIPPED | OUTPATIENT
Start: 2018-11-15 | End: 2018-11-25

## 2018-11-15 RX ORDER — ONDANSETRON 2 MG/ML
4 INJECTION INTRAMUSCULAR; INTRAVENOUS EVERY 6 HOURS PRN
Status: DISCONTINUED | OUTPATIENT
Start: 2018-11-15 | End: 2018-11-18 | Stop reason: HOSPADM

## 2018-11-15 RX ORDER — SODIUM CHLORIDE 9 MG/ML
100 INJECTION, SOLUTION INTRAVENOUS CONTINUOUS
Status: DISCONTINUED | OUTPATIENT
Start: 2018-11-15 | End: 2018-11-17

## 2018-11-15 RX ORDER — OXYCODONE HYDROCHLORIDE AND ACETAMINOPHEN 5; 325 MG/1; MG/1
1 TABLET ORAL EVERY 8 HOURS PRN
Status: DISCONTINUED | OUTPATIENT
Start: 2018-11-15 | End: 2018-11-16

## 2018-11-15 RX ORDER — CEFUROXIME AXETIL 250 MG/1
500 TABLET ORAL EVERY 12 HOURS SCHEDULED
Status: DISCONTINUED | OUTPATIENT
Start: 2018-11-15 | End: 2018-11-15

## 2018-11-15 RX ORDER — HYDROMORPHONE HCL 110MG/55ML
2 PATIENT CONTROLLED ANALGESIA SYRINGE INTRAVENOUS EVERY 4 HOURS PRN
Status: DISCONTINUED | OUTPATIENT
Start: 2018-11-15 | End: 2018-11-15

## 2018-11-15 RX ORDER — HYDROMORPHONE HCL/PF 1 MG/ML
1 SYRINGE (ML) INJECTION EVERY 4 HOURS PRN
Status: DISCONTINUED | OUTPATIENT
Start: 2018-11-15 | End: 2018-11-16

## 2018-11-15 RX ORDER — NICOTINE 21 MG/24HR
1 PATCH, TRANSDERMAL 24 HOURS TRANSDERMAL
Status: DISCONTINUED | OUTPATIENT
Start: 2018-11-15 | End: 2018-11-18 | Stop reason: HOSPADM

## 2018-11-15 RX ORDER — ACETAMINOPHEN 325 MG/1
650 TABLET ORAL EVERY 4 HOURS PRN
Status: DISCONTINUED | OUTPATIENT
Start: 2018-11-15 | End: 2018-11-18 | Stop reason: HOSPADM

## 2018-11-15 RX ORDER — KETOROLAC TROMETHAMINE 30 MG/ML
30 INJECTION, SOLUTION INTRAMUSCULAR; INTRAVENOUS EVERY 6 HOURS SCHEDULED
Status: DISCONTINUED | OUTPATIENT
Start: 2018-11-16 | End: 2018-11-18 | Stop reason: HOSPADM

## 2018-11-15 RX ORDER — OLANZAPINE 5 MG/1
5 TABLET ORAL
Status: DISCONTINUED | OUTPATIENT
Start: 2018-11-15 | End: 2018-11-18 | Stop reason: HOSPADM

## 2018-11-15 RX ADMIN — TRAZODONE HYDROCHLORIDE 50 MG: 50 TABLET ORAL at 00:00

## 2018-11-15 RX ADMIN — CEFUROXIME AXETIL 500 MG: 250 TABLET ORAL at 20:58

## 2018-11-15 RX ADMIN — HYDROMORPHONE HYDROCHLORIDE 2 MG: 2 INJECTION INTRAMUSCULAR; INTRAVENOUS; SUBCUTANEOUS at 06:05

## 2018-11-15 RX ADMIN — AZITHROMYCIN MONOHYDRATE 500 MG: 500 INJECTION, POWDER, LYOPHILIZED, FOR SOLUTION INTRAVENOUS at 23:46

## 2018-11-15 RX ADMIN — TRAZODONE HYDROCHLORIDE 50 MG: 50 TABLET ORAL at 23:28

## 2018-11-15 RX ADMIN — KETOROLAC TROMETHAMINE 30 MG: 30 INJECTION, SOLUTION INTRAMUSCULAR at 23:28

## 2018-11-15 RX ADMIN — KETOROLAC TROMETHAMINE 30 MG: 30 INJECTION, SOLUTION INTRAMUSCULAR at 04:58

## 2018-11-15 RX ADMIN — HYDROMORPHONE HYDROCHLORIDE 0.5 MG: 1 INJECTION, SOLUTION INTRAMUSCULAR; INTRAVENOUS; SUBCUTANEOUS at 20:59

## 2018-11-15 RX ADMIN — SODIUM CHLORIDE 250 ML/HR: 0.9 INJECTION, SOLUTION INTRAVENOUS at 21:42

## 2018-11-15 RX ADMIN — OXYCODONE HYDROCHLORIDE AND ACETAMINOPHEN 2 TABLET: 5; 325 TABLET ORAL at 04:19

## 2018-11-15 RX ADMIN — KETOROLAC TROMETHAMINE 30 MG: 30 INJECTION, SOLUTION INTRAMUSCULAR at 11:27

## 2018-11-15 RX ADMIN — OXYCODONE HYDROCHLORIDE AND ACETAMINOPHEN 2 TABLET: 5; 325 TABLET ORAL at 00:00

## 2018-11-15 RX ADMIN — ONDANSETRON 4 MG: 2 INJECTION, SOLUTION INTRAMUSCULAR; INTRAVENOUS at 09:38

## 2018-11-15 RX ADMIN — NICOTINE 21 MG: 21 PATCH TRANSDERMAL at 00:00

## 2018-11-15 RX ADMIN — HYDROMORPHONE HYDROCHLORIDE 1 MG: 1 INJECTION, SOLUTION INTRAMUSCULAR; INTRAVENOUS; SUBCUTANEOUS at 02:43

## 2018-11-15 RX ADMIN — OLANZAPINE 5 MG: 5 TABLET, FILM COATED ORAL at 23:28

## 2018-11-15 RX ADMIN — OXYCODONE HYDROCHLORIDE AND ACETAMINOPHEN 2 TABLET: 5; 325 TABLET ORAL at 09:37

## 2018-11-15 RX ADMIN — AZITHROMYCIN MONOHYDRATE 500 MG: 500 INJECTION, POWDER, LYOPHILIZED, FOR SOLUTION INTRAVENOUS at 23:43

## 2018-11-15 RX ADMIN — RIVAROXABAN 15 MG: 15 TABLET, FILM COATED ORAL at 19:29

## 2018-11-15 RX ADMIN — CEFTRIAXONE 1000 MG: 1 INJECTION, SOLUTION INTRAVENOUS at 21:59

## 2018-11-15 RX ADMIN — OLANZAPINE 5 MG: 5 TABLET, FILM COATED ORAL at 00:11

## 2018-11-15 RX ADMIN — NICOTINE 1 PATCH: 21 PATCH, EXTENDED RELEASE TRANSDERMAL at 22:35

## 2018-11-15 RX ADMIN — ENOXAPARIN SODIUM 80 MG: 100 INJECTION SUBCUTANEOUS at 09:37

## 2018-11-15 RX ADMIN — LORAZEPAM 1 MG: 2 INJECTION INTRAMUSCULAR; INTRAVENOUS at 23:30

## 2018-11-15 RX ADMIN — HYDROMORPHONE HYDROCHLORIDE 2 MG: 2 INJECTION INTRAMUSCULAR; INTRAVENOUS; SUBCUTANEOUS at 10:57

## 2018-11-15 RX ADMIN — INFLUENZA VIRUS VACCINE 0.5 ML: 15; 15; 15; 15 SUSPENSION INTRAMUSCULAR at 13:54

## 2018-11-15 NOTE — PLAN OF CARE
Problem: DISCHARGE PLANNING - CARE MANAGEMENT  Goal: Discharge to post-acute care or home with appropriate resources  INTERVENTIONS:  - Conduct assessment to determine patient/family and health care team treatment goals, and need for post-acute services based on payer coverage, community resources, and patient preferences, and barriers to discharge  - Address psychosocial, clinical, and financial barriers to discharge as identified in assessment in conjunction with the patient/family and health care team  - Arrange appropriate level of post-acute services according to patient's   needs and preference and payer coverage in collaboration with the physician and health care team  - Communicate with and update the patient/family, physician, and health care team regarding progress on the discharge plan  - Arrange appropriate transportation to post-acute venues  - Patient's goal is to return home with family upon discharge   Outcome: Progressing

## 2018-11-15 NOTE — NURSING NOTE
Pt mother Angie Jacobo called again and stated that the pt did not have a ride because the brother in law would not be leaving work till after 6  Angie Jacobo stated that the pt needed a ride home

## 2018-11-15 NOTE — SOCIAL WORK
CM met with pt at bedside and explained role  Pt lives with his mother in a 2nd floor apartment  [address]  Pt ambulates independently  He does not use any DME  Pt report she is completely independent with ADLs expect for driving which his family provides  Pt PCP is through [facility]  He is unsure the name of his PCP as he has not seen him yet but has appointment scheduled for Monday 11/19  Pt uses [facility]  He denies having any discharge needs a tthis time  Per Dr Jesus Costa pt requires xaretlo Rx upon discharge  CM spoke with [facility] Rukandy Hercules who was able to run the Rx and pt has $3 copay  Pt aware of same and able to afford  Pt indicates his brother in paw will transport him home upon discharge  Cm to follow as needed  CM reviewed d/c planning process including the following: identifying help at home, patient preference for d/c planning needs, availability of treatment team to discuss questions or concerns patient and/or family may have regarding understanding medications and recognizing signs and symptoms once discharged  CM also encouraged patient to follow up with all recommended appointments after discharge  Patient advised of importance for patient and family to participate in managing patients medical well being

## 2018-11-15 NOTE — NURSING NOTE
Patient's mother called regarding plan for discharge to home today  Due to the weather the patient is unable to find a ride and stated "I will just leave"  The mother was concerned that he cannot just leave the hospital without a ride home  The PCM did explain to the mother that the patient was provided information regarding his options, including staying the night here at the hospital if a ride could not be obtained  The patient did leave the hospital without notification of staff

## 2018-11-15 NOTE — H&P
H&P- Jose De Jesus Zhang 1980, 41 y o  male MRN: [id number]    Unit/Bed#: -02 Encounter: [id number]    Primary Care Provider: No primary care provider on file  Date and time admitted to hospital: 11/14/2018 10:12 PM        * [facility] ([facility] )   Assessment & Plan    · CT Chest: Acute pulmonary emboli most pronounced within the lobar and segmental pulmonary arteries supplying the right lower lobe with suspected small pulmonary emboli also within the left lower lobe  No convincing CT evidence of right heart strain  ·  continue Lovenox  · Patient will need bridge to oral meds  He has insurance will need case management to check coverage  · Pulmonary consult  · Check echo  · Pain control  Pneumonia due to infectious organism   Assessment & Plan    · CT with Right lower lobe consolidation  · Continue antibiotics started in ER     Tobacco abuse   Assessment & Plan    · Nicotine patch     Bipolar affective disorder, depressed ([facility] )   Assessment & Plan    · Continue home medications       VTE Prophylaxis: Enoxaparin (Lovenox)  Code Status: full code  POLST: POLST is not applicable to this patient      Anticipated Length of Stay:  Patient will be admitted on an Inpatient basis with an anticipated length of stay of  > 2 midnights  Justification for Hospital Stay: pulmonary emboli treatment, IV antibiotics, pulmonary evaluation  Chief Complaint:   Right flank pain    History of Present Illness:    Jose De Jesus Zhang is a 41 y o  male who presents with right side flank pain  Patient reports coughing blood yesterday, fever at home, felt hot, cold sweats, chills, trouble sleeping, sharp pain in low back  Today pain traveled around to front of chest   Pain at rest 8/10, tries to move and takes deep breaths pain increases to 10/10  Feels hungry but not able to eat from pain, not able to sleep from pain  No weigh tloss  No travel  +smoker, was out over weekend riding bike   Does not think any clotting disorders  +family history of lung cancer  No swelling in legs  [place] PMED reviewed no drug hx  Review of Systems:  Review of Systems   Constitutional: Positive for appetite change, chills, fatigue and fever  HENT: Negative for trouble swallowing  Respiratory: Positive for cough, shortness of breath and wheezing  Cardiovascular: Positive for chest pain and palpitations  Negative for leg swelling  Gastrointestinal: Positive for abdominal pain  Negative for diarrhea, nausea and vomiting  Genitourinary: Negative for dysuria  Musculoskeletal: Positive for back pain  Skin: Negative  Neurological: Positive for weakness and light-headedness  Negative for dizziness  Psychiatric/Behavioral: Negative for confusion  Past Medical and Surgical History:   Past Medical History:   Diagnosis Date    Bipolar disorder [facility])        Past Surgical History:   Procedure Laterality Date    COSMETIC SURGERY      FACIAL RECONSTRUCTION SURGERY      FACIAL RECONSTRUCTION SURGERY         Meds/Allergies:  Prior to Admission medications    Medication Sig Start Date End Date Taking? Authorizing Provider   OLANZapine (ZyPREXA) 5 mg tablet Take 5 mg by mouth daily at bedtime   Yes Historical Provider, MD   traZODone (DESYREL) 100 mg tablet Take 50 mg by mouth daily at bedtime   Yes Historical Provider, MD     I have reviewed home medications with patient personally      Allergies: No Known Allergies    Social History:  Marital Status: Single   Occupation: unemployed going for disability with bipolar disorder  Patient Pre-hospital Living Situation: home  Patient Pre-hospital Level of Mobility: mobile  Patient Pre-hospital Diet Restrictions: none  Substance Use History:     History   Alcohol Use    Yes     Comment: social     History   Smoking Status    Current Every Day Smoker    Packs/day: 0 50    Years: 15 00    Types: Cigarettes   Smokeless Tobacco    Never Used     History   Drug Use No       Family History:  I have reviewed the patients family history    Physical Exam:   Vitals:           Blood Pressure      128/64      Temperature      98 9  Meghan [place]ryica Pulse      111      Respirations      17      SpO2      90      Weight - Scale      83 2  [facility] [place] Physical Exam   Constitutional: He is oriented to person, place, and time  He appears well-developed and well-nourished  HENT:   Head: Normocephalic and atraumatic  Eyes: Conjunctivae and EOM are normal  Right eye exhibits no discharge  Left eye exhibits no discharge  Neck: Normal range of motion  No tracheal deviation present  Cardiovascular: Normal rate, regular rhythm and normal heart sounds  Exam reveals no gallop and no friction rub  No murmur heard  Pulmonary/Chest: No respiratory distress  He has no wheezes  He has no rales  Decreased breath sounds bases, decreased inspiration secondary to pain   Abdominal: Soft  Bowel sounds are normal  He exhibits no distension  There is no tenderness  There is no rebound  Musculoskeletal: Normal range of motion  He exhibits no edema, tenderness or deformity  No edema, no calf pain   Neurological: He is alert and oriented to person, place, and time  Skin: Skin is warm and dry  No rash noted  No erythema  No pallor  Covered in tattoos   Psychiatric: He has a normal mood and affect  His behavior is normal  Judgment and thought content normal    Nursing note and vitals reviewed  Additional Data:   Lab Results: I have personally reviewed pertinent reports          Results from last 7 days  Lab Units 11/14/18  1712   WBC Thousand/uL 12 50*   HEMOGLOBIN g/dL 14 6   HEMATOCRIT % 44 1   PLATELETS Thousands/uL 253   NEUTROS PCT % 71   LYMPHS PCT % 18   MONOS PCT % 9   EOS PCT % 2       Results from last 7 days  Lab Units 11/14/18  1712   POTASSIUM mmol/L 4 2   CHLORIDE mmol/L 100   CO2 mmol/L 29   BUN mg/dL 7   CREATININE mg/dL 1 04   CALCIUM mg/dL 10 0   ALK PHOS U/L 79   ALT U/L 22   AST U/L 25 Imaging: I have personally reviewed pertinent reports  No orders to display   1  Acute pulmonary emboli most pronounced within the lobar and segmental  pulmonary arteries supplying the right lower lobe with suspected small  pulmonary emboli also within the left lower lobe  No convincing CT  evidence of right heart strain      2  Right lower lobe consolidation, which may be infectious/inflammatory  in etiology as this consolidation lacks the typical peripheral  triangular-shaped consolidation often seen with pulmonary infarctions      3  Trace right pleural effusion      4  No evidence of acute inflammatory process in the abdomen or pelvis on  this noncontrast exam   No nephrolithiasis, hydronephrosis, or visualized  ureteral stone  NetAccess/Epic Records Reviewed: Yes     ** Please Note: This note has been constructed using a voice recognition system   **

## 2018-11-15 NOTE — NURSING NOTE
Pt discharged to home  IV removed with catheter tip intact  Pressure and dressing applied until bleeding stopped  Dressing CDI  Discharge instructions disucssed  Pt verbalized understanding of instructions  VSS

## 2018-11-15 NOTE — ASSESSMENT & PLAN NOTE
· CT Chest: Acute pulmonary emboli most pronounced within the lobar and segmental pulmonary arteries supplying the right lower lobe with suspected small pulmonary emboli also within the left lower lobe  No convincing CT evidence of right heart strain    · Unspecified exact etiology  · Patient has an active lifestyle with the exception of some times he states he sits for long periods to paint  · Patient is adamant on wanting to be discharged  · Patient denies any lower extremity swelling and or shortness of breath  · Patient has not required oxygen  · Will discharge home on Xarelto, dosing will be 15 mg p o  b i d  for 21 days and then 20 mg p o  daily from day 22 onwards  · Outpatient follow-up with his PCP

## 2018-11-15 NOTE — EMTALA/ACUTE CARE TRANSFER
[address]  [address] [place] [id number]  [phone]  Dept: [address] [facility] CONSENT    NAME Rose Miramontes                                         1980                              MRN [id number]    I have been informed of my rights regarding examination, treatment, and transfer   by Dr Clint Beckwith MD    Benefits: Continuity of care    Risks: Potential for delay in receiving treatment, Potential deterioration of medical condition, Loss of IV, Increased discomfort during transfer, Possible worsening of condition or death during transfer      Transfer Request   I acknowledge that my medical condition has been evaluated and explained to me by the emergency department physician or other qualified medical person and/or my attending physician who has recommended and offered to me further medical examination and treatment  I understand the Hospital's obligation with respect to the treatment and stabilization of my emergency medical condition  I nevertheless request to be transferred  I release the Hospital, the doctor, and any other persons caring for me from all responsibility or liability for any injury or ill effects that may result from my transfer and agree to accept all responsibility for the consequences of my choice to transfer, rather than receive stabilizing treatment at the Hospital  I understand that because the transfer is my request, my insurance may not provide reimbursement for the services  The Hospital will assist and direct me and my family in how to make arrangements for transfer, but the hospital is not liable for any fees charged by the transport service  In spite of this understanding, I refuse to consent to further medical examination and treatment which has been offered to me, and request transfer to  Cipriano Ordaz Name, [address] : [facility] [address] [place], [place]   I authorize the performance of emergency medical procedures and treatments upon me in both transit and upon arrival at the receiving facility  Additionally, I authorize the release of any and all medical records to the receiving facility and request they be transported with me, if possible  I authorize the performance of emergency medical procedures and treatments upon me in both transit and upon arrival at the receiving facility  Additionally, I authorize the release of any and all medical records to the receiving facility and request they be transported with me, if possible  I understand that the safest mode of transportation during a medical emergency is an ambulance and that the Hospital advocates the use of this mode of transport  Risks of traveling to the receiving facility by car, including absence of medical control, life sustaining equipment, such as oxygen, and medical personnel has been explained to me and I fully understand them  (NARESH CORRECT BOX BELOW)  [ X ]  I consent to the stated transfer and to be transported by ambulance/helicopter  [  ]  I consent to the stated transfer, but refuse transportation by ambulance and accept full responsibility for my transportation by car  I understand the risks of non-ambulance transfers and I exonerate the Hospital and its staff from any deterioration in my condition that results from this refusal     X___________________________________________    DATE  18  TIME________  Signature of patient or legally responsible individual signing on patient behalf           RELATIONSHIP TO PATIENT_________________________          Provider Certification    NAME Flaco Segura                                        [facility] 1980                              MRN [id number]    A medical screening exam was performed on the above named patient  Based on the examination:    Condition Necessitating Transfer The primary encounter diagnosis was Pulmonary embolism ([address] )   A diagnosis of Pneumonia of right lower lobe due to infectious organism [facility]) was also pertinent to this visit  Patient Condition: An emergency transfer is being made prior to stabilization due to the need for definitive care and the benefit of transfer outweighs the risk, Other (Include comment)_________________________________________    Reason for Transfer:  (Direct admission)    Transfer Requirements: [address] [place], [place]   · Space available and qualified personnel available for treatment as acknowledged by    · Agreed to accept transfer and to provide appropriate medical treatment as acknowledged by       Dr Jaylin Pires  · Appropriate medical records of the examination and treatment of the patient are provided at the time of transfer   [address] [facility], [address] _______  · Transfer will be performed by qualified personnel from    and appropriate transfer equipment as required, including the use of necessary and appropriate life support measures      Provider Certification: I have examined the patient and explained the following risks and benefits of being transferred/refusing transfer to the patient/family:  General risk, such as traffic hazards, adverse weather conditions, rough terrain or turbulence, possible failure of equipment (including vehicle or aircraft), or consequences of actions of persons outside the control of the transport personnel, Unanticipated needs of medical equipment and personnel during transport, Risk of worsening condition, The possibility of a transport vehicle being unavailable      Based on these reasonable risks and benefits to the patient and/or the unborn child(kateryna), and based upon the information available at the time of the patients examination, I certify that the medical benefits reasonably to be expected from the provision of appropriate medical treatments at another medical facility outweigh the increasing risks, if any, to the individuals medical condition, and in the case of labor to the unborn child, from effecting the transfer      X____________________________________________ DATE 11/14/18        TIME_______      ORIGINAL - SEND TO MEDICAL RECORDS   COPY - SEND WITH PATIENT DURING TRANSFER

## 2018-11-15 NOTE — DISCHARGE INSTRUCTIONS
How to Stop Smoking   AMBULATORY CARE:   You will improve your health and the health of others around you  if you stop smoking  Your risk for heart and lung disease, cancer, stroke, heart attack, and vision problems will also decrease  You can benefit from quitting no matter how long you have smoked  Prepare to stop smoking:  Nicotine is a highly addictive drug found in cigarettes  Withdrawal symptoms can happen when you stop smoking and make it hard to quit  These include anxiety, depression, irritability, trouble sleeping, and increased appetite  You increase your chances of success if you prepare to quit  · Set a quit date  Trey Vázquez a date that is within the next 2 weeks  Do not pick a day that you think may be stressful or busy  Write down the day or [place] it on your calender  · Tell friends and family that you plan to quit  Explain that you may have withdrawal symptoms when you try to quit  Ask them to support you  They may be able to encourage you and help reduce your stress to make it easier for you to quit  · Make a list of your reasons for quitting  Put the list somewhere you will see it every day, such as your refrigerator  You can look at the list when you have a craving  · Remove all tobacco and nicotine products from your home, car, and workplace  Also, remove anything else that will tempt you to smoke, such as lighters, matches, or ashtrays  Clean your car, home, and places at work that smell like smoke  The smell of smoke can trigger a craving  · Identify triggers that make you want to smoke  This may include activities, feelings, or people  Also write down 1 way you can deal with each of your triggers  For example, if you want to smoke as soon as you wake up, plan another activity during this time, such as exercise  · Make a plan for how you will quit  Learn about the tools that can help you quit, such as medicine, counseling, or nicotine replacement therapy   Choose at least 2 options to help you quit  Tools to help you stop smoking:   · Counseling  from a trained healthcare provider can provide you with support and skills to quit smoking  The provider will also teach you to manage your withdrawal symptoms and cravings  You may receive counseling from one counselor, in group therapy, or through phone therapy called a quit line  · Nicotine replacement therapy (NRT)  such as nicotine patches, gum, or lozenges may help reduce your nicotine cravings  You may get these without a doctor's order  Do not use e-cigarettes or smokeless tobacco in place of cigarettes or to help you quit  They still contain nicotine  · Prescription medicines  such as nasal sprays or nicotine inhalers may help reduce your withdrawal symptoms  Other medicines may also be used to reduce your urge to smoke  Ask your healthcare provider about these medicines  You may need to start certain medicines 2 weeks before your quit date for them to work well  · Hypnosis  is a practice that helps guide you through thoughts and feelings  Hypnosis may help decrease your cravings and make you more willing to quit  · Acupuncture therapy  uses very thin needles to balance energy channels in the body  This is thought to help decrease cravings and symptoms of nicotine withdrawal      · Support groups  let you talk to others who are trying to quit or have already quit  It may be helpful to speak with others about how they quit  Manage your cravings:   · Avoid situations, people, and places that tempt you to smoke  Go to nonsmoking places, such as libraries or restaurants  Understand what tempts you and try to avoid these things  · Keep your hands busy  Hold things such as a stress ball or pen  · Put candy or toothpicks in your mouth  Keep lollipops, sugarless gum, or toothpicks with you at all times  · Do not have alcohol or caffeine  These drinks may tempt you to smoke   Drink healthy liquids such as water or juice instead  · Reward yourself when you resist your cravings  Rewards will motivate you and help you stay positive  · Do an activity that distracts you from your craving  Examples include going for a walk, exercising, or cleaning  Prevent weight gain after you quit:  You may gain a few pounds after you quit smoking  It is healthier for you to gain a few pounds than to continue to smoke  The following can help you prevent weight gain:  · Eat healthy foods  These include fruits, vegetables, whole-grain breads, low-fat dairy products, beans, lean meats, and fish  Eat healthy snacks, such as low-fat yogurt, if you get hungry between meals  · Drink water before, during, and between meals  This will make your stomach feel full and help prevent you from overeating  Ask your healthcare provider how much liquid to drink each day and which liquids are best for you  · Exercise  Take a walk or do some kind of exercise every day  Ask your healthcare provider what exercise is right for you  This may help reduce your cravings and reduce stress  For more support and information:   · [employer]  [employer]  Phone: 2- 825 - 489-0955  Web Address: www smokefree  gov  © 2017 [address] [employer] Information is for End User's use only and may not be sold, redistributed or otherwise used for commercial purposes  All illustrations and images included in CareNotes® are the copyrighted property of [employer]  or Jeyson Phillips  The above information is an  only  It is not intended as medical advice for individual conditions or treatments  Talk to your doctor, nurse or pharmacist before following any medical regimen to see if it is safe and effective for you  Cigarette Smoking and Your Health, Ambulatory Care   GENERAL INFORMATION:   What are the risks to my health if I smoke? Chemicals in tobacco are addictive and damage every cell in your body   All tobacco products are dangerous to you and to nonsmokers who breathe your secondhand smoke  Even if you are a light smoker or a social smoker, you have an increased risk for cancer, heart disease, and lung disease  If you are pregnant or have diabetes, smoking increases your risk for complications  What are the benefits to my health if I stop smoking? · Lower risk of cancer, heart disease, blood clots, heart attack, and stroke    · Lower risk of diabetes complications, such as kidney, artery, or eye disease, and nerve damage that can result in amputations    · Lower risk of lung infections and diseases, such as pneumonia, asthma, chronic bronchitis, and emphysema           · Increased benefits of chemotherapy if you already have cancer, and decreased risk for cancer returning after treatment    · Improved circulation, allowing more oxygen to be delivered to your body    · Improved ability to heal and to fight infections  What are the benefits to the health of others if I stop smoking? · Lower risk of lung cancer and heart disease in nonsmokers    · Lower risk of miscarriage, early delivery, low birth weight, and stillbirth    · Lower risk of SIDS, obesity, developmental delay, ear infections, colds, pneumonia, bronchitis, asthma, and ADHD in your child  Where can I find more information and support to stop smoking? It is never too late to stop smoking  Ask your healthcare provider for more information if you need help  · [place]  Phone: 0- 225 - 302-2707  Web Address: www [employer]  Follow up with your healthcare provider as directed:  Write down your questions so you remember to ask them during your visits  CARE AGREEMENT:   You have the right to help plan your care  Learn about your health condition and how it may be treated  Discuss treatment options with your caregivers to decide what care you want to receive  You always have the right to refuse treatment  The above information is an  only   It is not intended as medical advice for individual conditions or treatments  Talk to your doctor, nurse or pharmacist before following any medical regimen to see if it is safe and effective for you  © 2014 [address] is for End User's use only and may not be sold, redistributed or otherwise used for commercial purposes  All illustrations and images included in CareNotes® are the copyrighted property of [employer]  or Jeyson Phillips

## 2018-11-15 NOTE — ASSESSMENT & PLAN NOTE
· CT Chest: Acute pulmonary emboli most pronounced within the lobar and segmental pulmonary arteries supplying the right lower lobe with suspected small pulmonary emboli also within the left lower lobe  No convincing CT evidence of right heart strain  ·  continue Lovenox  · Patient will need bridge to oral meds  He has insurance will need case management to check coverage  · Pulmonary consult  · Check echo  · Pain control

## 2018-11-15 NOTE — NURSING NOTE
Pt mother called regarding Pt discharge  Caller stated that pt had no ride home and will need transportation  Explained to pt mother that I would notify case management of situation   Jennifer Bhatt was notified and due to weather conditions, transportation could not be arranged  Pt was notified  About this  Pt stated that he had a ride that his brother in law would pick him up after work

## 2018-11-15 NOTE — ASSESSMENT & PLAN NOTE
· Clinically patient has no stigmata that would suggest pneumonia  · Patient is afebrile  · Patient has a very mild leukocytosis  · No further need for antibiotics

## 2018-11-15 NOTE — UTILIZATION REVIEW
Initial Clinical Review    Admission: Date/Time/Statement: 11/14/18 @ 2212     Orders Placed This Encounter   Procedures    Inpatient Admission     Standing Status:   Standing     Number of Occurrences:   1     Order Specific Question:   Admitting Physician     Answer:   Ryan Ortiz     Order Specific Question:   Level of Care     Answer:   Med Surg [16]     Order Specific Question:   Estimated length of stay     Answer:   More than 2 Midnights     Order Specific Question:   Certification     Answer:   I certify that inpatient services are medically necessary for this patient for a duration of greater than two midnights  See H&P and MD Progress Notes for additional information about the patient's course of treatment  History of Illness:     41year old presents to ED with right flank pain  Pt reports coughing blood yesterday, fever and chills at home  Today pain is at the front of her chest,  8/10  To  10/10 on inspiration  Medical history  Includes:  Smoking and family lung cancer        ED Vital Signs:   11/15/18 0309 11/15/18 0309 11/15/18 0309 11/14/18 2356 11/15/18 0309   98 3 °F (36 8 °C) 94 18 106/58 96 %      Worst Possible Pain       11/15/18 83 kg (183 lb)           Abnormal Labs/Diagnostic Test Results:    Wbc 12 90     Decreased  Breath sounds  At bilateral bases      CT chest   1   Acute pulmonary emboli most pronounced within the lobar and segmental  pulmonary arteries supplying the right lower lobe with suspected small  pulmonary emboli also within the left lower lobe   No convincing CT  evidence of right heart strain      2   Right lower lobe consolidation, which may be infectious/inflammatory  in etiology as this consolidation lacks the typical peripheral  triangular-shaped consolidation often seen with pulmonary infarctions      3   Pleural effusion      4   No evidence of acute inflammatory process in the abdomen or pelvis on  this noncontrast exam   No nephrolithiasis, hydronephrosis, or visualized  ureteral stone  Past Medical History:    Bipolar disorder [facility])        Admitting Diagnosis:     Pulmonary embolism (HCC) [I26 99]  RLL pneumonia ([facility] ) [J18 1]    Age/Sex: 41 y o  male  Admitted for pulmonary emboli treatment, IV antibiotics, pulmonary evaluation  Requires  2L O2 nc to maintain O2 sats  96%  Assessment/Plan:    Acute pulmonary embolism (HCC)   Assessment & Plan     · CT Chest: Acute pulmonary emboli most pronounced within the lobar and segmental pulmonary arteries supplying the right lower lobe with suspected small pulmonary emboli also within the left lower lobe   No convincing CT evidence of right heart strain  ·  continue Lovenox  · Patient will need bridge to oral meds  He has insurance will need case management to check coverage    · Pulmonary consult  · Check echo  · Pain control       Pneumonia due to infectious organism   Assessment & Plan     · CT with Right lower lobe consolidation  · Continue antibiotics started in ER           Admission Orders:    Telemetry  Consult pulmonology   ECHO  BLOOD CULTURE X 2   URINE CULTURE     Scheduled Meds:     acetaminophen 650 mg Oral Q4H PRN   azithromycin 500 mg Intravenous Q24H   cefTRIAXone 1,000 mg Intravenous Q24H   enoxaparin 1 mg/kg Subcutaneous Q12H [facility]   HYDROmorphone 2 mg Intravenous Q4H PRN   influenza vaccine 0 5 mL Intramuscular Prior to discharge   ketorolac 30 mg Intravenous Q6H [facility]   nicotine 21 mg Transdermal HS   OLANZapine 5 mg Oral HS   ondansetron 4 mg Intravenous Q6H PRN   oxyCODONE-acetaminophen 2 tablet Oral Q4H PRN   traZODone 50 mg Oral HS

## 2018-11-15 NOTE — DISCHARGE SUMMARY
Discharge- Dorcas Alva 1980, 41 y o  male MRN: [id number]    Unit/Bed#: -02 Encounter: [id number]    Primary Care Provider: No primary care provider on file  Date and time admitted to hospital: 11/14/2018 10:12 PM        * Acute pulmonary embolism ([facility] )   Assessment & Plan    · CT Chest: Acute pulmonary emboli most pronounced within the lobar and segmental pulmonary arteries supplying the right lower lobe with suspected small pulmonary emboli also within the left lower lobe  No convincing CT evidence of right heart strain  · Unspecified exact etiology  · Patient has an active lifestyle with the exception of some times he states he sits for long periods to paint  · Patient is adamant on wanting to be discharged  · Patient denies any lower extremity swelling and or shortness of breath  · Patient has not required oxygen  · Will discharge home on Xarelto, dosing will be 15 mg p o  b i d  for 21 days and then 20 mg p o  daily from day 22 onwards  · Outpatient follow-up with his PCP     Bipolar affective disorder, depressed ([facility] )   Assessment & Plan    · DC home on pre-admit meds at pre-admit dosages     Pneumonia due to infectious organism   Assessment & Plan    · Clinically patient has no stigmata that would suggest pneumonia  · Patient is afebrile  · Patient has a very mild leukocytosis  · No further need for antibiotics     Tobacco abuse   Assessment & Plan    · Cessation counseling offered  · Patient not ready yet to quit           Discharging Physician / Practitioner: Juancarlos Rowland MD  PCP: No primary care provider on file    Admission Date:   Admission Orders     Ordered        11/14/18 2240  Inpatient Admission  Once             Discharge Date: 11/15/18    Resolved Problems  Date Reviewed: 11/14/2018    None          Consultations During Hospital Stay:  · None    Procedures Performed:   · None    Significant Findings / Test Results:   · CT chest PE protocol - Acute pulmonary emboli most pronounced within the lobar and segmental  pulmonary arteries supplying the right lower lobe with suspected small  pulmonary emboli also within the left lower lobe   No convincing CT  evidence of right heart strain  Incidental Findings:   · None     Test Results Pending at Discharge (will require follow up): · None     Outpatient Tests Requested:  · None    Complications:  None    Reason for Admission:   Pulmonary embolism    Hospital Course:     Germania Gramajo is a 41 y o  male patient who originally presented to the hospital on 11/14/2018 due to flank pain  Patient had a CT scan of the chest performed  Was diagnosed with a pulmonary embolism  Initial concerns were also worrisome for possible pneumonia  Patient was [facility] to [facility]  He was started on full-dose anticoagulation with Lovenox twice a day  Patient had no clinical stigmata concerning for possible pneumonia  Antibiotics were stopped  Patient had no pain or discomfort in his bilateral lower extremities  Patient did not feel short of breath  He did not require oxygen  A hypercoagulable panel was not ordered in view of the patient already having been started on anticoagulation by the ER  On day of discharge patient was eager on wanting to leave  Patient was prescribe Xarelto  He will take 15 mg twice a day for 21 days and then will take 20 mg once a day from day 22 onwards  He has been advised to follow up with his primary care physician  Please see above list of diagnoses and related plan for additional information       Condition at Discharge: good     Discharge Day Visit / Exam:     Subjective:  Patient seen and examined, no complaints no distress is eager on wanting to leave before the weather gets worse  Vitals: Blood Pressure: 109/65 (11/15/18 1216)  Pulse: 94 (11/15/18 1216)  Temperature: (!) 97 1 °F (36 2 °C) (11/15/18 1216)  Temp Source: Tympanic (11/15/18 1216)  Respirations: 18 (11/15/18 1216)  Height: 5' 11" (180 3 cm) (11/15/18 0309)  Weight - Scale: 83 kg (183 lb) (11/15/18 0309)  SpO2: 95 % (11/15/18 1216)  Exam:   Physical Exam   Constitutional: He is oriented to person, place, and time  He appears well-developed and well-nourished  HENT:   Head: Normocephalic and atraumatic  Nose: Nose normal    Mouth/Throat: Oropharynx is clear and moist    Eyes: Pupils are equal, round, and reactive to light  Conjunctivae and EOM are normal    Neck: Normal range of motion  Neck supple  No JVD present  No thyromegaly present  Cardiovascular: Normal rate, regular rhythm and intact distal pulses  Exam reveals no gallop and no friction rub  No murmur heard  Pulmonary/Chest: Effort normal and breath sounds normal  No respiratory distress  Abdominal: Soft  Bowel sounds are normal  He exhibits no distension and no mass  There is no tenderness  There is no guarding  Musculoskeletal: Normal range of motion  He exhibits no edema  Lymphadenopathy:     He has no cervical adenopathy  Neurological: He is alert and oriented to person, place, and time  No cranial nerve deficit  Skin: Skin is warm  No rash noted  No erythema  Psychiatric: He has a normal mood and affect  His behavior is normal    Vitals reviewed  Discussion with Family:   No family was present at the time of my discharge evaluation, nor did the patient want me to call anyone    Discharge instructions/Information to patient and family:   See after visit summary for information provided to patient and family  Provisions for Follow-Up Care:  See after visit summary for information related to follow-up care and any pertinent home health orders  Disposition:     Home    For Discharges to [facility] SNF:   · Not Applicable to this Patient - Not Applicable to this Patient    Planned Readmission:   None     Discharge Statement:  I spent 35 minutes discharging the patient  This time was spent on the day of discharge   I had direct contact with the patient on the day of discharge  Greater than 50% of the total time was spent examining patient, answering all patient questions, arranging and discussing plan of care with patient as well as directly providing post-discharge instructions  Additional time then spent on discharge activities  Discharge Medications:  See after visit summary for reconciled discharge medications provided to patient and family        ** Please Note: This note has been constructed using a voice recognition system **

## 2018-11-15 NOTE — ED PROVIDER NOTES
History  Chief Complaint   Patient presents with    Chest Pain     pt  was seen at this ED yesterday, diagnosed with PE, admitted overnight and discharged 3 hours ago  weather conditions left patient with no ride home  he arrives stating "I can't walk home and I'm in so much pain"      81-year-old male who presents to the ED with right sided chest pain  Patient was discharged 3 hours ago from inpatient for pulmonary embolism  Patient was waiting for his ride home and due to the snow it was delayed, started having recurrence of the pain  Patient also notes that he did not take his nighttime dose of Xarelto  Denies any new symptoms including no shortness of breath, fever/chills, nausea/vomiting/diarrhea/constipation, abdominal pain, urinary symptoms, lower extremity edema  Prior to Admission Medications   Prescriptions Last Dose Informant Patient Reported? Taking?    OLANZapine (ZyPREXA) 5 mg tablet 11/14/2018 at Unknown time  Yes Yes   Sig: Take 5 mg by mouth daily at bedtime   oxyCODONE-acetaminophen (PERCOCET) 5-325 mg per tablet Unknown at Unknown time  No No   Sig: Take 1 tablet by mouth every 8 (eight) hours as needed for moderate pain for up to 10 days Earliest Fill Date: 11/15/18 Max Daily Amount: 3 tablets   rivaroxaban (XARELTO) 15 mg tablet Unknown at Unknown time  No No   Sig: Take 1 tablet (15 mg total) by mouth 2 (two) times a day with meals   rivaroxaban (XARELTO) 20 mg tablet Unknown at Unknown time  No No   Sig: Take 1 tablet (20 mg total) by mouth daily with breakfast   traZODone (DESYREL) 50 mg tablet 11/14/2018 at Unknown time  Yes Yes   Sig: Take 50 mg by mouth daily at bedtime      Facility-Administered Medications: None       Past Medical History:   Diagnosis Date    [facility] [address] [facility]     Pulmonary embolism [facility] [address] )        Past Surgical History:   Procedure Laterality Date    COSMETIC SURGERY      FACIAL RECONSTRUCTION SURGERY      FACIAL RECONSTRUCTION SURGERY Family History   Problem Relation Age of Onset    Cancer Mother     Cancer Father     Cancer Maternal Grandmother     Cancer Maternal Grandfather     Cancer Paternal Grandmother     Cancer Paternal Grandfather      I have reviewed and agree with the history as documented  Social History   Substance Use Topics    Smoking status: Current Every Day Smoker     Packs/day: 1 00     Years: 15 00     Types: Cigarettes    Smokeless tobacco: Never Used    Alcohol use Yes      Comment: social        Review of Systems   Respiratory: Negative for shortness of breath  Cardiovascular: Positive for chest pain  Negative for leg swelling  Gastrointestinal: Negative for abdominal pain, nausea and vomiting  Physical Exam  Physical Exam   Constitutional: He is oriented to person, place, and time  He appears well-developed and well-nourished  He appears distressed (Moderate)  HENT:   Head: Normocephalic and atraumatic  Right Ear: External ear normal    Left Ear: External ear normal    Eyes: Pupils are equal, round, and reactive to light  Conjunctivae and EOM are normal    Neck: Normal range of motion  Neck supple  No JVD present  Cardiovascular: Regular rhythm, normal heart sounds and intact distal pulses  No murmur heard  Tachycardia   Pulmonary/Chest: Effort normal and breath sounds normal  No respiratory distress  He has no wheezes  He has no rales  Abdominal: Soft  Bowel sounds are normal  There is no tenderness  There is no guarding  Musculoskeletal: Normal range of motion  He exhibits no edema  Neurological: He is alert and oriented to person, place, and time  No cranial nerve deficit  Skin: Skin is warm and dry  Capillary refill takes less than 2 seconds  Psychiatric: He has a normal mood and affect  Nursing note and vitals reviewed        Vital Signs  ED Triage Vitals [11/15/18 1807]   Temperature Pulse Respirations Blood Pressure SpO2   98 8 °F (37 1 °C) (!) 114 18 122/73 97 % Temp Source Heart Rate Source Patient Position - Orthostatic VS BP Location FiO2 (%)   Temporal -- -- -- --      Pain Score       8           Vitals:    11/15/18 1807   BP: 122/73   Pulse: (!) 114       Visual Acuity      ED Medications  Medications   oxyCODONE-acetaminophen (PERCOCET) 5-325 mg per tablet 1 tablet (not administered)   rivaroxaban (XARELTO) tablet 20 mg (not administered)       Diagnostic Studies  Results Reviewed     None                 No orders to display              Procedures  Procedures       Phone Contacts  ED Phone Contact    ED Course                               MDM  Number of Diagnoses or Management Options  Hypoxia:   Pneumonia of right lower lobe due to infectious organism [facility]):   Pulmonary embolus [facility]):   Diagnosis management comments: Signed out to Dr Elisha Driver at [phone] pending further evaluation    CritCare Time    Disposition  Final diagnoses:   None     ED Disposition     None      Follow-up Information    None         Patient's Medications   Discharge Prescriptions    No medications on file     No discharge procedures on file      ED Provider  Electronically Signed by           Gary Nascimento DO  11/16/18 6262

## 2018-11-16 ENCOUNTER — APPOINTMENT (INPATIENT)
Dept: RADIOLOGY | Facility: HOSPITAL | Age: 38
DRG: 134 | End: 2018-11-16
Payer: COMMERCIAL

## 2018-11-16 LAB
ANION GAP SERPL CALCULATED.3IONS-SCNC: 7 MMOL/L (ref 4–13)
ATRIAL RATE: 107 BPM
BUN SERPL-MCNC: 15 MG/DL (ref 7–25)
CALCIUM SERPL-MCNC: 8.7 MG/DL (ref 8.6–10.5)
CHLORIDE SERPL-SCNC: 106 MMOL/L (ref 98–107)
CO2 SERPL-SCNC: 24 MMOL/L (ref 21–31)
CREAT SERPL-MCNC: 0.97 MG/DL (ref 0.7–1.3)
ERYTHROCYTE [DISTWIDTH] IN BLOOD BY AUTOMATED COUNT: 13.9 % (ref 11.5–14.5)
GFR SERPL CREATININE-BSD FRML MDRD: 99 ML/MIN/1.73SQ M
GLUCOSE SERPL-MCNC: 131 MG/DL (ref 65–99)
HCT VFR BLD AUTO: 34.6 % (ref 36.5–49.3)
HGB BLD-MCNC: 11.5 G/DL (ref 14–18)
L PNEUMO1 AG UR QL IA.RAPID: NEGATIVE
MCH RBC QN AUTO: 30.3 PG (ref 26–34)
MCHC RBC AUTO-ENTMCNC: 33.3 G/DL (ref 31–37)
MCV RBC AUTO: 91 FL (ref 81–99)
P AXIS: 7 DEGREES
PLATELET # BLD AUTO: 204 THOUSANDS/UL (ref 149–390)
PMV BLD AUTO: 8.3 FL (ref 8.6–11.7)
POTASSIUM SERPL-SCNC: 3.4 MMOL/L (ref 3.5–5.5)
PR INTERVAL: 166 MS
PROCALCITONIN SERPL-MCNC: 0.22 NG/ML
QRS AXIS: 25 DEGREES
QRSD INTERVAL: 82 MS
QT INTERVAL: 312 MS
QTC INTERVAL: 416 MS
RBC # BLD AUTO: 3.81 MILLION/UL (ref 4.3–5.9)
S PNEUM AG UR QL: NEGATIVE
SODIUM SERPL-SCNC: 137 MMOL/L (ref 134–143)
T WAVE AXIS: 29 DEGREES
VENTRICULAR RATE: 107 BPM
WBC # BLD AUTO: 10.3 THOUSAND/UL (ref 4.8–10.8)

## 2018-11-16 PROCEDURE — 93010 ELECTROCARDIOGRAM REPORT: CPT | Performed by: INTERNAL MEDICINE

## 2018-11-16 PROCEDURE — 94760 N-INVAS EAR/PLS OXIMETRY 1: CPT

## 2018-11-16 PROCEDURE — 99253 IP/OBS CNSLTJ NEW/EST LOW 45: CPT | Performed by: INTERNAL MEDICINE

## 2018-11-16 PROCEDURE — 99232 SBSQ HOSP IP/OBS MODERATE 35: CPT | Performed by: HOSPITALIST

## 2018-11-16 PROCEDURE — 85027 COMPLETE CBC AUTOMATED: CPT | Performed by: PHYSICIAN ASSISTANT

## 2018-11-16 PROCEDURE — 71045 X-RAY EXAM CHEST 1 VIEW: CPT

## 2018-11-16 PROCEDURE — 84145 PROCALCITONIN (PCT): CPT | Performed by: PHYSICIAN ASSISTANT

## 2018-11-16 PROCEDURE — 80048 BASIC METABOLIC PNL TOTAL CA: CPT | Performed by: PHYSICIAN ASSISTANT

## 2018-11-16 RX ORDER — HYDROMORPHONE HCL 110MG/55ML
2 PATIENT CONTROLLED ANALGESIA SYRINGE INTRAVENOUS
Status: DISCONTINUED | OUTPATIENT
Start: 2018-11-16 | End: 2018-11-17

## 2018-11-16 RX ORDER — OXYCODONE HYDROCHLORIDE AND ACETAMINOPHEN 5; 325 MG/1; MG/1
2 TABLET ORAL EVERY 8 HOURS PRN
Status: DISCONTINUED | OUTPATIENT
Start: 2018-11-16 | End: 2018-11-18 | Stop reason: HOSPADM

## 2018-11-16 RX ORDER — ALBUTEROL SULFATE 90 UG/1
2 AEROSOL, METERED RESPIRATORY (INHALATION) EVERY 4 HOURS PRN
Status: DISCONTINUED | OUTPATIENT
Start: 2018-11-16 | End: 2018-11-18 | Stop reason: HOSPADM

## 2018-11-16 RX ORDER — HYDROMORPHONE HCL 110MG/55ML
2 PATIENT CONTROLLED ANALGESIA SYRINGE INTRAVENOUS EVERY 4 HOURS PRN
Status: DISCONTINUED | OUTPATIENT
Start: 2018-11-16 | End: 2018-11-16

## 2018-11-16 RX ADMIN — HYDROMORPHONE HYDROCHLORIDE 2 MG: 2 INJECTION INTRAMUSCULAR; INTRAVENOUS; SUBCUTANEOUS at 20:06

## 2018-11-16 RX ADMIN — OLANZAPINE 5 MG: 5 TABLET, FILM COATED ORAL at 21:14

## 2018-11-16 RX ADMIN — OXYCODONE HYDROCHLORIDE AND ACETAMINOPHEN 1 TABLET: 5; 325 TABLET ORAL at 12:56

## 2018-11-16 RX ADMIN — SODIUM CHLORIDE 100 ML/HR: 9 INJECTION, SOLUTION INTRAVENOUS at 11:53

## 2018-11-16 RX ADMIN — ONDANSETRON 4 MG: 2 INJECTION, SOLUTION INTRAMUSCULAR; INTRAVENOUS at 11:54

## 2018-11-16 RX ADMIN — HYDROMORPHONE HYDROCHLORIDE 2 MG: 2 INJECTION INTRAMUSCULAR; INTRAVENOUS; SUBCUTANEOUS at 13:56

## 2018-11-16 RX ADMIN — ENOXAPARIN SODIUM 90 MG: 100 INJECTION SUBCUTANEOUS at 11:55

## 2018-11-16 RX ADMIN — NICOTINE 1 PATCH: 21 PATCH, EXTENDED RELEASE TRANSDERMAL at 21:15

## 2018-11-16 RX ADMIN — KETOROLAC TROMETHAMINE 30 MG: 30 INJECTION, SOLUTION INTRAMUSCULAR at 06:10

## 2018-11-16 RX ADMIN — SODIUM CHLORIDE 100 ML/HR: 9 INJECTION, SOLUTION INTRAVENOUS at 23:00

## 2018-11-16 RX ADMIN — KETOROLAC TROMETHAMINE 30 MG: 30 INJECTION, SOLUTION INTRAMUSCULAR at 11:54

## 2018-11-16 RX ADMIN — SODIUM CHLORIDE 100 ML/HR: 9 INJECTION, SOLUTION INTRAVENOUS at 00:00

## 2018-11-16 RX ADMIN — HYDROMORPHONE HYDROCHLORIDE 2 MG: 2 INJECTION INTRAMUSCULAR; INTRAVENOUS; SUBCUTANEOUS at 22:04

## 2018-11-16 RX ADMIN — ENOXAPARIN SODIUM 90 MG: 100 INJECTION SUBCUTANEOUS at 21:14

## 2018-11-16 RX ADMIN — HYDROMORPHONE HYDROCHLORIDE 2 MG: 2 INJECTION INTRAMUSCULAR; INTRAVENOUS; SUBCUTANEOUS at 17:07

## 2018-11-16 RX ADMIN — TRAZODONE HYDROCHLORIDE 50 MG: 50 TABLET ORAL at 21:15

## 2018-11-16 RX ADMIN — KETOROLAC TROMETHAMINE 30 MG: 30 INJECTION, SOLUTION INTRAMUSCULAR at 19:11

## 2018-11-16 RX ADMIN — ALBUTEROL SULFATE 2 PUFF: 90 AEROSOL, METERED RESPIRATORY (INHALATION) at 11:52

## 2018-11-16 NOTE — ED NOTES
Pt  Refusing percocet  Stating "that doesn't work fast enough for me"  Provider notified        Gavin Kingsley, HANNAH  11/15/18 2040

## 2018-11-16 NOTE — ASSESSMENT & PLAN NOTE
· Case discussed with Pulmonary - all antibiotics have been stopped for this initial suspected pneumonia  · Procalcitonin is within normal limits  · White blood cell count is within normal limit  · Continue supportive therapy only at this time

## 2018-11-16 NOTE — ED PROVIDER NOTES
History  Chief Complaint   Patient presents with    Chest Pain     pt  was seen at this ED yesterday, diagnosed with PE, admitted overnight and discharged 3 hours ago  weather conditions left patient with no ride home  he arrives stating "I can't walk home and I'm in so much pain"      HPI    Prior to Admission Medications   Prescriptions Last Dose Informant Patient Reported? Taking? OLANZapine (ZyPREXA) 5 mg tablet 11/14/2018 at Unknown time  Yes Yes   Sig: Take 5 mg by mouth daily at bedtime   oxyCODONE-acetaminophen (PERCOCET) 5-325 mg per tablet Unknown at Unknown time  No No   Sig: Take 1 tablet by mouth every 8 (eight) hours as needed for moderate pain for up to 10 days Earliest Fill Date: 11/15/18 Max Daily Amount: 3 tablets   rivaroxaban (XARELTO) 15 mg tablet Unknown at Unknown time  No No   Sig: Take 1 tablet (15 mg total) by mouth 2 (two) times a day with meals   rivaroxaban (XARELTO) 20 mg tablet Unknown at Unknown time  No No   Sig: Take 1 tablet (20 mg total) by mouth daily with breakfast   traZODone (DESYREL) 50 mg tablet 11/14/2018 at Unknown time  Yes Yes   Sig: Take 50 mg by mouth daily at bedtime      Facility-Administered Medications: None       Past Medical History:   Diagnosis Date    [facility] [address] [facility]     Pulmonary embolism ([facility] [address] )        Past Surgical History:   Procedure Laterality Date    COSMETIC SURGERY      FACIAL RECONSTRUCTION SURGERY      FACIAL RECONSTRUCTION SURGERY         Family History   Problem Relation Age of Onset    Cancer Mother     Cancer Father     Cancer Maternal Grandmother     Cancer Maternal Grandfather     Cancer Paternal Grandmother     Cancer Paternal Grandfather      I have reviewed and agree with the history as documented      Social History   Substance Use Topics    Smoking status: Current Every Day Smoker     Packs/day: 1 00     Years: 15 00     Types: Cigarettes    Smokeless tobacco: Never Used    Alcohol use Yes      Comment: social Review of Systems    Physical Exam  Physical Exam    Vital Signs  ED Triage Vitals [11/15/18 1807]   Temperature Pulse Respirations Blood Pressure SpO2   98 8 °F (37 1 °C) (!) 114 18 122/73 97 %      Temp Source Heart Rate Source Patient Position - Orthostatic VS BP Location FiO2 (%)   Temporal -- -- -- --      Pain Score       8           Vitals:    11/15/18 1807 11/15/18 1815 11/15/18 1933   BP: 122/73 122/73 132/62   Pulse: (!) 114  (!) 122       Visual Acuity      ED Medications  Medications   oxyCODONE-acetaminophen (PERCOCET) 5-325 mg per tablet 1 tablet (not administered)   rivaroxaban (XARELTO) tablet 15 mg (15 mg Oral Given 11/15/18 1929)       Diagnostic Studies  Results Reviewed     None                 No orders to display              Procedures  Procedures       Phone Contacts  ED Phone Contact    ED Course                               [facility]  CritCare Time    Disposition  Final diagnoses:   None     ED Disposition     None      Follow-up Information    None         Patient's Medications   Discharge Prescriptions    No medications on file     No discharge procedures on file      ED Provider  Electronically Signed by

## 2018-11-16 NOTE — CONSULTS
Consultation - Pulmonary Medicine   Juan Stuart 41 y o  male MRN: [id number]  Unit/Bed#: -01 Encounter: [id number]      Physician Requesting Consult:   Dr Constantino Sewell  Reason for Consult:   Pulmonary embolism, questionable pneumonia    Assessment/Plan:    1  Acute pulmonary embolism without a clear predisposing risk factor  · Given lack of predisposing risk factor, treatment duration is 6 months  2  Probable pulmonary infarct resulting in pain and pulmonary infiltrates along with fever  · Doubt pneumonia; will DC antibiotics  3  Needs increased pain medications for pain management prior to discharge home  4  Explained the importance of compliance with outpatient management and treatment of pulmonary embolism  Explained that failure to comply with treatment could result in death  He needs to establish follow-up with a family doctor in order to follow through with the treatment regimen   ______________________________________________________________________    HPI:    Juan Stuart is a 41 y o  male who presents to the emergency room with right-sided chest pain  He had just been discharged from the hospital 3 hours prior to his ER visit  He tried walking home in the snow but had too much pain  He had also Mrs  nighttime dose of Xarelto  Vitals in ER included heart rate of 114, temperature 98 8° and sats 97%  He received Percocet in the emergency room and another dose of Xarelto  He did not have any imaging this admission  He reports that his symptoms started a couple days prior to his 1st admission November 14, 2018  He developed fevers, sweats and thought he had the flu  He then developed back pain in his right flank  This went around to the front near his right pectoralis muscle  He was having trouble breathing and and had a slight amount of hemoptysis  He waited a couple days and then finally came to the emergency room     During his initial ER visit, he was treated with antibiotics, morphine, Dilaudid and CT chest was done  CT chest demonstrated acute pulmonary embolism within the lobar and segmental pulmonary artery supplying the right lower lobe as well as small pulmonary emboli in the left lower lobe  There is trace right pleural effusion and patchy consolidation in the right lower lobe  Also noted was subsegmental atelectasis in the right middle and left lower lobe  He denies history of lung disease  He is generally active  He is currently not working  He recently was jailed for 4 months and has been living with his mother  He denies leg swelling  He still have an occasional blood-tinged sputum  He states that the medications for pain are not helping  He has history of bipolar disorder  He was seen in [facility] by Dr Chrissy Whipple and expects to see her as an outpatient  He previously had a family doctor but needs to re-establish a new 1  He smokes half pack per day  He previously had alcohol problems but quit that  He denies family history of hypercoagulability  He denies any recent immobility or leg injuries  Denies any leg swelling although has right ankle discomfort since the summer  PFT results:     Pulmonary Functions Testing Results:    No results found for: FEV1, FVC, QLD6NSS, TLC, DLCO      Review of Systems:Review of Systems   Constitutional: Positive for activity change and appetite change  HENT: Positive for dental problem  Respiratory: Positive for cough and shortness of breath  Cardiovascular: Positive for chest pain  Negative for leg swelling  Musculoskeletal: Positive for back pain  Psychiatric/Behavioral: Positive for dysphoric mood and sleep disturbance  The patient is nervous/anxious          Historical Information   Past Medical History:   Diagnosis Date    [facility] [address] [facility]     Pulmonary embolism ([facility] )      Past Surgical History:   Procedure Laterality Date    COSMETIC SURGERY      FACIAL RECONSTRUCTION SURGERY       Social History   History   Alcohol Use    Yes     Comment: social     History   Smoking Status    Current Every Day Smoker    Packs/day: 1 00    Years: 15 00    Types: Cigarettes   Smokeless Tobacco    Never Used       Occupational history:  Currently not working    Family History:   Family History   Problem Relation Age of Onset    Cancer Mother     Cancer Father     Cancer Maternal Grandmother     Cancer Maternal Grandfather     Cancer Paternal Grandmother     Cancer Paternal Grandfather        Medications: The patient's active and prehospital medications were reviewed  Current Facility-Administered Medications:  acetaminophen 650 mg Oral Q4H PRN Joen [employer] PA-C    albuterol 2 puff Inhalation Q4H PRN Morris Sanchez MD    cefTRIAXone 1,000 mg Intravenous Q24H Joen Leisure, PA-C Last Rate: 1,000 mg (11/15/18 2159)   And        azithromycin 500 mg Intravenous Q24H Joen Leisure, PA-C Last Rate: 500 mg (11/15/18 2346)   enoxaparin 1 mg/kg Subcutaneous Q12H [address] Claire Sandoval, PA-C    HYDROmorphone 1 mg Intravenous Q4H PRN Joen Leisure, PA-C    ketorolac 30 mg Intravenous Q6H [address] Claire Sandoval, PA-C    LORazepam 1 mg Intravenous Q6H PRN Joen Leisure, PA-C    nicotine 1 patch Transdermal HS Claire Sandoval, PA-C    OLANZapine 5 mg Oral HS Claire Sandoval, PA-C    ondansetron 4 mg Intravenous Q6H PRN Joen Leisure, PA-C    oxyCODONE-acetaminophen 1 tablet Oral Q8H PRN Joen Leisure, PA-C    sodium chloride 250 mL/hr Intravenous Continuous Waverly Pattee, DO Last Rate: 250 mL/hr (11/15/18 2142)   sodium chloride 100 mL/hr Intravenous Continuous Joen Leisure, PA-C Last Rate: 100 mL/hr (11/16/18 1153)   traZODone 50 mg Oral HS Joen Leisure, PA-C          PhysicalExamination:Physical Exam   Constitutional: He is oriented to person, place, and time  He appears well-developed and well-nourished  He is cooperative  He appears distressed  Neck: No JVD present   No thyromegaly present  Cardiovascular: Normal rate and normal heart sounds  No murmur heard  Pulmonary/Chest: No stridor  He exhibits tenderness  Decreased air movement secondary to pain   Abdominal: Soft  There is no tenderness  Musculoskeletal: He exhibits no edema, tenderness or deformity  Lymphadenopathy:     He has no cervical adenopathy  Neurological: He is alert and oriented to person, place, and time  Skin:   Multiple tattoos   Psychiatric: He has a normal mood and affect  His behavior is normal  Judgment and thought content normal      Vitals:   Vitals:    11/15/18 2340 11/16/18 0100 11/16/18 0811 11/16/18 1127   BP: 104/60  124/70    BP Location: Left arm  Left arm    Pulse: (!) 113 (!) 110 103 90   Resp: 20 22 22 20   Temp: 100 2 °F (37 9 °C)  100 °F (37 8 °C)    TempSrc: Tympanic  Temporal    SpO2: 96% 95% 92% 91%   Weight:       Height:         Body mass index is 26 38 kg/m²  Temp  Min: 97 1 °F (36 2 °C)  Max: 102 5 °F (39 2 °C)  IBW: 75 3 kg    SpO2: 91 %,   SpO2 Activity: At Rest,   O2 Device: None (Room air)      Diagnostic Data:  CBC:     Results from last 7 days  Lab Units 11/16/18  0456 11/15/18  0431 11/14/18  1712   WBC Thousand/uL 10 30 12 90* 12 50*   HEMOGLOBIN g/dL 11 5* 14 2 14 6   HEMATOCRIT % 34 6* 43 4 44 1   PLATELETS Thousands/uL 204 252 253       CMP:     Results from last 7 days  Lab Units 11/16/18 0455 11/15/18  0431 11/14/18  1712   POTASSIUM mmol/L 3 4* 3 9 4 2   CHLORIDE mmol/L 106 101 100   CO2 mmol/L 24 30 29   BUN mg/dL 15 8 7   CREATININE mg/dL 0 97 1 09 1 04   CALCIUM mg/dL 8 7 9 7 10 0   ALK PHOS U/L  --   --  79   ALT U/L  --   --  22   AST U/L  --   --  25         Microbiology:    Results from last 7 days  Lab Units 11/14/18 1954 11/14/18 1953   BLOOD CULTURE  No Growth at 24 hrs  No Growth at 24 hrs         ABG: No results found for: PHART, ZNS6TUD, PO2ART, HJS4FRC, Q2CYQMHQ, BEART, SOURCE    Imaging: Procedure: Ct Renal Stone Study Abdomen Pelvis Without Contrast    Result Date: 11/14/2018  Narrative: INDICATION:  Severe right sided chest pain  Right flank pain  Evaluate for pulmonary embolism  Evaluate for kidney stones  ORDERING PROVIDER:  Sotero Ko  TECHNIQUE:  CTA of the chest was performed following rapid injection of intravenous contrast   3D angiographic image post processing was generated  Omnipaque 350 80 mL was administered intravenously  CT of the abdomen and pelvis was performed without intravenous contrast  Automated mA/kV exposure control was utilized and patient examination was performed in strict accordance with principles of ALARA  RADIATION AMOUNT:  1232 6 mGy-cm  COMPARISON:  None Available  FINDINGS:  CTA Chest: There is evidence of acute pulmonary embolism within the lobar and segmental pulmonary arteries supplying the right lower lobe  Suspected small pulmonary emboli also noted in the pulmonary arteries supplying the left lower lobe  No thoracic aortic aneurysm or dissection  The heart is normal in size without pericardial effusion  There are a few subcentimeter mediastinal lymph nodes, but no definite pathologically enlarged lymph nodes are seen in the chest  Trace right pleural effusion  No pneumothorax  Central airways are patent  Patchy consolidation in the right lower lobe  Subsegmental atelectasis in the right middle lobe and left lower lobe  Small hiatal hernia  Abdomen: CT appearance of the gallbladder is unremarkable  No hydronephrosis  No nephrolithiasis or visualized ureteral stone  The liver, pancreas, spleen and adrenal glands demonstrate no acute pathology  There is no free air or lymph node enlargement  No abdominal aortic aneurysm or dissection  Pelvis: There is no bowel wall thickening or obstruction  Normal appendix  No free fluid in the pelvis  Multiple small phleboliths in the pelvis  Lymph nodes are not enlarged  Urinary bladder is unremarkable  Skeleton:  There are no acute fractures    No suspicious bony lesions  Impression: 1  Acute pulmonary emboli most pronounced within the lobar and segmental pulmonary arteries supplying the right lower lobe with suspected small pulmonary emboli also within the left lower lobe  No convincing CT evidence of right heart strain  2   Right lower lobe consolidation, which may be infectious/inflammatory in etiology as this consolidation lacks the typical peripheral triangular-shaped consolidation often seen with pulmonary infarctions  3   Trace right pleural effusion  4   No evidence of acute inflammatory process in the abdomen or pelvis on this noncontrast exam   No nephrolithiasis, hydronephrosis, or visualized ureteral stone  NOTIFICATION:  The critical results of the study were discussed with, and acknowledged by Dr Geneva Hobbs by telephone on 11/14/2018 at 19:25  Signed by Brent Jarvis MD    Procedure: Cta Chest Pe Study    Result Date: 11/14/2018  Narrative: INDICATION:  Severe right sided chest pain  Right flank pain  Evaluate for pulmonary embolism  Evaluate for kidney stones  ORDERING PROVIDER:  Shalini Jordan  TECHNIQUE:  CTA of the chest was performed following rapid injection of intravenous contrast   3D angiographic image post processing was generated  Omnipaque 350 80 mL was administered intravenously  CT of the abdomen and pelvis was performed without intravenous contrast  Automated mA/kV exposure control was utilized and patient examination was performed in strict accordance with principles of ALARA  RADIATION AMOUNT:  1232 6 mGy-cm  COMPARISON:  None Available  FINDINGS:  CTA Chest: There is evidence of acute pulmonary embolism within the lobar and segmental pulmonary arteries supplying the right lower lobe  Suspected small pulmonary emboli also noted in the pulmonary arteries supplying the left lower lobe  No thoracic aortic aneurysm or dissection  The heart is normal in size without pericardial effusion    There are a few subcentimeter mediastinal lymph nodes, but no definite pathologically enlarged lymph nodes are seen in the chest  Trace right pleural effusion  No pneumothorax  Central airways are patent  Patchy consolidation in the right lower lobe  Subsegmental atelectasis in the right middle lobe and left lower lobe  Small hiatal hernia  Abdomen: CT appearance of the gallbladder is unremarkable  No hydronephrosis  No nephrolithiasis or visualized ureteral stone  The liver, pancreas, spleen and adrenal glands demonstrate no acute pathology  There is no free air or lymph node enlargement  No abdominal aortic aneurysm or dissection  Pelvis: There is no bowel wall thickening or obstruction  Normal appendix  No free fluid in the pelvis  Multiple small phleboliths in the pelvis  Lymph nodes are not enlarged  Urinary bladder is unremarkable  Skeleton:  There are no acute fractures  No suspicious bony lesions  Impression: 1  Acute pulmonary emboli most pronounced within the lobar and segmental pulmonary arteries supplying the right lower lobe with suspected small pulmonary emboli also within the left lower lobe  No convincing CT evidence of right heart strain  2   Right lower lobe consolidation, which may be infectious/inflammatory in etiology as this consolidation lacks the typical peripheral triangular-shaped consolidation often seen with pulmonary infarctions  3   Trace right pleural effusion  4   No evidence of acute inflammatory process in the abdomen or pelvis on this noncontrast exam   No nephrolithiasis, hydronephrosis, or visualized ureteral stone  NOTIFICATION:  The critical results of the study were discussed with, and acknowledged by Dr Silva Meckel by telephone on 11/14/2018 at 19:25  Signed by Rere Friedman MD    Area of pulmonary infiltrate correspond to the area of pulmonary embolism    No plain films available and no repeat imaging performed this admission    Cardiac lab/EKG/telemetry/ECHO:       Sinus tachycardia  Otherwise normal ECG  When compared with ECG of 14-NOV-2018 17:19,  No significant change was found  Confirmed by PANCHO SANTANA ([id number]) on 11/16/2018 10:08:33 AM    Meaghan Phelps MD

## 2018-11-16 NOTE — ASSESSMENT & PLAN NOTE
· Currently has significantly improved  · Case discussed with pulmonary Services  · This is all secondary to pulmonary embolisms and pulmonary infarcts  · Antibiotics have been discontinued by Pulmonary Services for the initial suspected pneumonia

## 2018-11-16 NOTE — PROGRESS NOTES
Progress Note - Gaviota Alejandre 1980, 41 y o  male MRN: [id number]    Unit/Bed#: -01 Encounter: [id number]    Primary Care Provider: No primary care provider on file  Date and time admitted to hospital: 11/15/2018  6:04 PM        Acute pulmonary embolism (HCC)   Assessment & Plan    · Continue Xarelto  · Main issue at this time is adequate pain control  · Will increase Dilaudid to 2 mg IV every 4 hours as needed for pain     Bipolar affective disorder, depressed ([address] )   Assessment & Plan    · Continue home medications     * Pneumonia due to infectious organism   Assessment & Plan    · Case discussed with Pulmonary - all antibiotics have been stopped for this initial suspected pneumonia  · Procalcitonin is within normal limits  · White blood cell count is within normal limit  · Continue supportive therapy only at this time     Tobacco abuse   Assessment & Plan    · Nicotine patch     Acute respiratory insufficiency   Assessment & Plan    · Currently has significantly improved  · Case discussed with pulmonary Services  · This is all secondary to pulmonary embolisms and pulmonary infarcts  · Antibiotics have been discontinued by Pulmonary Services for the initial suspected pneumonia     Sepsis ([facility] )   Assessment & Plan    · Resolved  · No further IV antibiotics  · See the assessment plan above for pneumonia         VTE Pharmacologic Prophylaxis: Pharmacologic: Rivaroxaban (Xarelto)    Patient Centered Rounds: I have performed bedside rounds with nursing staff today  Discussions with Specialists or Other Care Team Provider:   Pulmonary Services, case management, nursing  Education and Discussions with Family / Patient:   Patient was brought up to par with the plan of care for today all questions answered to his satisfaction  Time Spent for Care: 30 minutes  More than 50% of total time spent on counseling and coordination of care as described above      Current Length of Stay: 1 day(s)    Current Patient Status: Inpatient   Certification Statement: The patient will continue to require additional inpatient hospital stay due to The need for IV pain medications    Discharge Plan:   Discharge planning 24 x 48 hours    Code Status: Level 1 - Full Code    Subjective:   Patient seen and examined  Patient is complaining of severe right posterior chest pain that is worse with deep breaths  Objective:     Vitals:   Temp (24hrs), Av 7 °F (38 2 °C), Min:98 8 °F (37 1 °C), Max:102 5 °F (39 2 °C)    Temp:  [98 8 °F (37 1 °C)-102 5 °F (39 2 °C)] 100 °F (37 8 °C)  HR:  [] 90  Resp:  [18-24] 20  BP: (104-139)/(60-73) 124/70  SpO2:  [91 %-99 %] 91 %  Body mass index is 26 38 kg/m²  Input and Output Summary (last 24 hours): Intake/Output Summary (Last 24 hours) at 18 1301  Last data filed at 18 1153   Gross per 24 hour   Intake             2220 ml   Output                0 ml   Net             2220 ml       Physical Exam:     Physical Exam   Constitutional: He is oriented to person, place, and time  He appears well-developed and well-nourished  HENT:   Head: Normocephalic and atraumatic  Nose: Nose normal    Mouth/Throat: Oropharynx is clear and moist    Eyes: Pupils are equal, round, and reactive to light  Conjunctivae and EOM are normal    Neck: Normal range of motion  Neck supple  No JVD present  No thyromegaly present  Cardiovascular: Normal rate, regular rhythm and intact distal pulses  Exam reveals no gallop and no friction rub  No murmur heard  Pulmonary/Chest: Effort normal and breath sounds normal  No respiratory distress  Abdominal: Soft  Bowel sounds are normal  He exhibits no distension and no mass  There is no tenderness  There is no guarding  Musculoskeletal: Normal range of motion  He exhibits no edema  Lymphadenopathy:     He has no cervical adenopathy  Neurological: He is alert and oriented to person, place, and time  No cranial nerve deficit     Skin: Skin is warm  No rash noted  No erythema  Psychiatric: He has a normal mood and affect  His behavior is normal    Vitals reviewed  Additional Data:     Labs:      Results from last 7 days  Lab Units 11/16/18  0456  11/14/18  1712   WBC Thousand/uL 10 30  < > 12 50*   HEMOGLOBIN g/dL 11 5*  < > 14 6   HEMATOCRIT % 34 6*  < > 44 1   PLATELETS Thousands/uL 204  < > 253   NEUTROS PCT %  --   --  71   LYMPHS PCT %  --   --  18   MONOS PCT %  --   --  9   EOS PCT %  --   --  2   < > = values in this interval not displayed  Results from last 7 days  Lab Units 11/16/18  0455  11/14/18  1712   POTASSIUM mmol/L 3 4*  < > 4 2   CHLORIDE mmol/L 106  < > 100   CO2 mmol/L 24  < > 29   BUN mg/dL 15  < > 7   CREATININE mg/dL 0 97  < > 1 04   CALCIUM mg/dL 8 7  < > 10 0   ALK PHOS U/L  --   --  79   ALT U/L  --   --  22   AST U/L  --   --  25   < > = values in this interval not displayed  * I Have Reviewed All Lab Data Listed Above  * Additional Pertinent Lab Tests Reviewed: [address] Admission  Reviewed    Imaging:  Imaging Reports Reviewed Today Include:   None    Recent Cultures (last 7 days):       Results from last 7 days  Lab Units 11/15/18  2232 11/14/18  1954 11/14/18  1953   BLOOD CULTURE   --  No Growth at 24 hrs  No Growth at 24 hrs     LEGIONELLA URINARY ANTIGEN  Negative  --   --        Last 24 Hours Medication List:     Current Facility-Administered Medications:  acetaminophen 650 mg Oral Q4H PRN Suan Apley, PA-C    albuterol 2 puff Inhalation Q4H PRN Jeevan Gay MD    enoxaparin 1 mg/kg Subcutaneous Q12H [facility] Suan Apley, PA-C    HYDROmorphone 2 mg Intravenous Q4H PRN Daria Jaramillo MD    ketorolac 30 mg Intravenous Q6H [facility] Claire Sandoval PA-C    LORazepam 1 mg Intravenous Q6H PRN Suan Apley, PA-C    nicotine 1 patch Transdermal HS Claire Sandoval PA-C    OLANZapine 5 mg Oral HS Claire Sandoval PA-C    ondansetron 4 mg Intravenous Q6H PRN Nini Nicole PA-C    oxyCODONE-acetaminophen 1 tablet Oral Q8H PRN Nini Nicole PA-C    sodium chloride 250 mL/hr Intravenous Continuous Eagle Walter, DO Last Rate: 250 mL/hr (11/15/18 2142)   sodium chloride 100 mL/hr Intravenous Continuous Nini Nicole PA-C Last Rate: 100 mL/hr (11/16/18 1153)   traZODone 50 mg Oral HS Nini Nicole PA-C         Today, Patient Was Seen By: Charles Roy MD    ** Please Note: Dictation voice to text software may have been used in the creation of this document   **

## 2018-11-16 NOTE — ASSESSMENT & PLAN NOTE
· Continue Xarelto  · Main issue at this time is adequate pain control  · Will increase Dilaudid to 2 mg IV every 4 hours as needed for pain

## 2018-11-16 NOTE — RESPIRATORY THERAPY NOTE
RT Protocol Note  Jose De Jesus Grandchild 41 y o  male MRN: [id number]  Unit/Bed#: -01 Encounter: [id number]    Assessment    Principal Problem:    Pneumonia due to infectious organism  Active Problems:    Acute pulmonary embolism (HCC)    Bipolar affective disorder, depressed ([facility] )    Tobacco abuse    Acute respiratory insufficiency    Sepsis ([facility] )      Home Pulmonary Medications:  none  Home Devices/Therapy: Other (Comment) (none)    Past Medical History:   Diagnosis Date    Bipolar disorder ([place] [address] )     Pulmonary embolism ([place] [address] )      Social History     Social History    Marital status: Single     Spouse name: N/A    Number of children: N/A    Years of education: N/A     Social History Main Topics    Smoking status: Current Every Day Smoker     Packs/day: 1 00     Years: 15 00     Types: Cigarettes    Smokeless tobacco: Never Used    Alcohol use Yes      Comment: social    Drug use: No    Sexual activity: No     Other Topics Concern    None     Social History Narrative    None       Subjective         Objective    Physical Exam:   Assessment Type: Assess only  General Appearance: Sleeping  Respiratory Pattern: Tachypneic  Chest Assessment: Chest expansion symmetrical  Bilateral Breath Sounds: Diminished  Cough: None    Vitals:  Blood pressure 104/60, pulse (!) 110, temperature 100 2 °F (37 9 °C), temperature source Tympanic, resp  rate 22, height 5' 11" (1 803 m), weight 85 8 kg (189 lb 2 oz), SpO2 95 %  Imaging and other studies: I have personally reviewed pertinent reports              Plan             Resp Comments: pt sleeping, is in room

## 2018-11-16 NOTE — H&P
H&P- Bethanie Lucero 1980, 41 y o  male MRN: [id number]    Unit/Bed#: -01 Encounter: [id number]    Primary Care Provider: No primary care provider on file  Date and time admitted to hospital: 11/15/2018  6:04 PM        * Pneumonia due to infectious organism   Assessment & Plan    · Continue rocephin and azithro   · Respiratory protocol  · Follow up cultures  · Will send procalcitonin     Sepsis ([facility] )   Assessment & Plan    · Secondary to pneumonia  · Noted by fever, tachycardia, leukocytosis     Acute respiratory insufficiency   Assessment & Plan    · Multifactorial with Pneumonia and PE     Bipolar affective disorder, depressed ([facility] )   Assessment & Plan    · Continue home medications     Acute pulmonary embolism (HCC)   Assessment & Plan    · RLL  · Continue Xarelto  · Pain control     Tobacco abuse   Assessment & Plan    · Nicotine patch       VTE Prophylaxis: Rivaroxaban (Xarelto)  Code Status: full code  POLST: POLST is not applicable to this patient    Anticipated Length of Stay:  Patient will be admitted on an Inpatient basis with an anticipated length of stay of  > 2 midnights  Justification for Hospital Stay: IV antibiotics, sepsis work up    Chief Complaint:   Chest pain    History of Present Illness:    Bethanie Lucero is a 41 y o  male who presents with chest pain  Patient was admitted 11/14 for PE and pneumonia, he requested discharge on 11/15  He did not have a ride home and could not walk home from the pain and ride did not come so he returned to the ER  There he had severe pain on the right lower lobe/side area  10/10  Worse with breathing and moving  He also reported in ER with having shaking chills  In the ER he was noted to have fever, tachycardia, rigors and pain  Dr Isa Camacho reports pulse ox 89% on Room air at some time  Review of Systems:  Review of Systems   Constitutional: Positive for chills, fatigue and fever  HENT: Negative for sore throat and trouble swallowing  Eyes: Negative for discharge  Respiratory: Positive for cough and shortness of breath  Hemoptysis   Cardiovascular: Positive for chest pain  Gastrointestinal: Positive for nausea  Negative for abdominal pain, diarrhea and vomiting  Genitourinary: Negative for difficulty urinating and dysuria  Musculoskeletal: Positive for back pain  Negative for neck pain  Skin: Negative  Neurological: Positive for dizziness, weakness, light-headedness and headaches  Psychiatric/Behavioral: Negative for confusion  Past Medical and Surgical History:   Past Medical History:   Diagnosis Date    Bipolar disorder ([facility] )     Pulmonary embolism ([facility] )        Past Surgical History:   Procedure Laterality Date    COSMETIC SURGERY      FACIAL RECONSTRUCTION SURGERY         Meds/Allergies:  Prior to Admission medications    Medication Sig Start Date End Date Taking? Authorizing Provider   OLANZapine (ZyPREXA) 5 mg tablet Take 5 mg by mouth daily at bedtime   Yes Historical Provider, MD   traZODone (DESYREL) 50 mg tablet Take 50 mg by mouth daily at bedtime   Yes Historical Provider, MD   oxyCODONE-acetaminophen (PERCOCET) 5-325 mg per tablet Take 1 tablet by mouth every 8 (eight) hours as needed for moderate pain for up to 10 days Earliest Fill Date: 11/15/18 Max Daily Amount: 3 tablets 11/15/18 11/25/18  Ronald Lentz MD   rivaroxaban (XARELTO) 15 mg tablet Take 1 tablet (15 mg total) by mouth 2 (two) times a day with meals 11/15/18   Ronald Lentz MD   rivaroxaban (XARELTO) 20 mg tablet Take 1 tablet (20 mg total) by mouth daily with breakfast 12/7/18   Ronald Lentz MD     I have reviewed home medications with patient personally      Allergies: No Known Allergies    Social History:  Marital Status: Single   Occupation: disabled  Patient Pre-hospital Living Situation: home  Patient Pre-hospital Level of Mobility: active  Patient Pre-hospital Diet Restrictions: none  Substance Use History:     History   Alcohol Use    Yes     Comment: social     History   Smoking Status    Current Every Day Smoker    Packs/day: 1 00    Years: 15 00    Types: Cigarettes   Smokeless Tobacco    Never Used     History   Drug Use No       Family History:  I have reviewed the patients family history    Physical Exam:   Vitals:   Blood Pressure: 139/72 (11/15/18 2220)  Pulse: (!) 117 (11/15/18 2220)  Temperature: (!) 101 8 °F (38 8 °C) (11/15/18 2220)  Temp Source: Tympanic (11/15/18 2220)  Respirations: 18 (11/15/18 2220)  Height: 5' 11" (180 3 cm) (11/15/18 2220)  Weight - Scale: 85 8 kg (189 lb 2 oz) (11/15/18 2220)  SpO2: 98 % (11/15/18 2220)    Physical Exam   Constitutional: He is oriented to person, place, and time  He appears well-developed and well-nourished  Appears uncomfortable, in more distress than yesterday evening   HENT:   Head: Normocephalic and atraumatic  Eyes: Conjunctivae and EOM are normal  Right eye exhibits no discharge  Left eye exhibits no discharge  Neck: Normal range of motion  No tracheal deviation present  Cardiovascular: Exam reveals no gallop and no friction rub  No murmur heard  tachy   Pulmonary/Chest: He is in respiratory distress  He has no wheezes  He has no rales  He exhibits tenderness  Decreased BS, shallow breath w/ pain   Abdominal: Soft  Bowel sounds are normal  He exhibits no distension  There is no tenderness  There is no rebound  Musculoskeletal: Normal range of motion  He exhibits no edema, tenderness or deformity  Neurological: He is alert and oriented to person, place, and time  Skin: Skin is warm and dry  No rash noted  No erythema  No pallor  Psychiatric: He has a normal mood and affect  His behavior is normal  Judgment and thought content normal    Nursing note and vitals reviewed  Additional Data:   Lab Results: I have personally reviewed pertinent reports          Results from last 7 days  Lab Units 11/15/18  0431 11/14/18  4032 WBC Thousand/uL 12 90* 12 50*   HEMOGLOBIN g/dL 14 2 14 6   HEMATOCRIT % 43 4 44 1   PLATELETS Thousands/uL 252 253   NEUTROS PCT %  --  71   LYMPHS PCT %  --  18   MONOS PCT %  --  9   EOS PCT %  --  2       Results from last 7 days  Lab Units 11/15/18  0431 11/14/18  1712   POTASSIUM mmol/L 3 9 4 2   CHLORIDE mmol/L 101 100   CO2 mmol/L 30 29   BUN mg/dL 8 7   CREATININE mg/dL 1 09 1 04   CALCIUM mg/dL 9 7 10 0   ALK PHOS U/L  --  79   ALT U/L  --  22   AST U/L  --  25                   Imaging: I have personally reviewed pertinent reports  No orders to display   CT results from 11/14    [facility] [facility] Reviewed: Yes     ** Please Note: This note has been constructed using a voice recognition system   **

## 2018-11-16 NOTE — SOCIAL WORK
Chart reviewed by case management, assessment has been completed, pt is a readmission on the the same day as d/c , pt went top wait for a ride and was planning to walk home, when he went outside he stated his chest hurt and then he came back to the hospital, pt will need transportation home, pt lives with his mother in a 2nd floor apartment, 1 step outside and 12 steps to the apartment, he has a rx plan at [employer] pt smoke 1/2 pkg cig/day, he declined any teaching  He stated he knows what he needs to do and he has not smoked since he has been in the hospital, denies any recreational drugs and admits to alcohol on occasions, pt is independent except for driving and depends on family & friends, cm will continue to follow and assess for any additional d/c needs, pt had a pulmonary consult  D/c plan was discussed at care coordination rounds  CM reviewed d/c planning process including the following: identifying help at home, patient preference for d/c planning needs, availability of treatment team to discuss questions or concerns patient and/or family may have regarding understanding medications and recognizing signs and symptoms once discharged  CM also encouraged patient to follow up with all recommended appointments after discharge  Patient advised of importance for patient and family to participate in managing patients medical well being

## 2018-11-16 NOTE — ASSESSMENT & PLAN NOTE
· Continue rocephin and azithro Day 2 tonight  · Respiratory protocol  · Follow up cultures  · Will send procalcitonin

## 2018-11-16 NOTE — PLAN OF CARE
DISCHARGE PLANNING - CARE MANAGEMENT     Discharge to post-acute care or home with appropriate resources Progressing        GASTROINTESTINAL - ADULT     Minimal or absence of nausea and/or vomiting Progressing     Maintains or returns to baseline bowel function Progressing     Maintains adequate nutritional intake Progressing     Establish and maintain optimal ostomy function Progressing        HEMATOLOGIC - ADULT     Maintains hematologic stability Progressing        METABOLIC, FLUID AND ELECTROLYTES - ADULT     Electrolytes maintained within normal limits Progressing     Fluid balance maintained Progressing     Glucose maintained within target range Progressing        RESPIRATORY - ADULT     Achieves optimal ventilation and oxygenation Progressing

## 2018-11-16 NOTE — PLAN OF CARE
GASTROINTESTINAL - ADULT     Minimal or absence of nausea and/or vomiting Progressing     Maintains or returns to baseline bowel function Progressing     Maintains adequate nutritional intake Progressing     Establish and maintain optimal ostomy function Progressing        METABOLIC, FLUID AND ELECTROLYTES - ADULT     Electrolytes maintained within normal limits Progressing     Fluid balance maintained Progressing     Glucose maintained within target range Progressing        RESPIRATORY - ADULT     Achieves optimal ventilation and oxygenation Progressing
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
## 2018-11-17 LAB
ANION GAP SERPL CALCULATED.3IONS-SCNC: 6 MMOL/L (ref 4–13)
BASOPHILS # BLD AUTO: 0.1 THOUSANDS/ΜL (ref 0–0.1)
BASOPHILS NFR BLD AUTO: 1 % (ref 0–2)
BUN SERPL-MCNC: 10 MG/DL (ref 7–25)
CALCIUM SERPL-MCNC: 8.9 MG/DL (ref 8.6–10.5)
CHLORIDE SERPL-SCNC: 103 MMOL/L (ref 98–107)
CO2 SERPL-SCNC: 28 MMOL/L (ref 21–31)
CREAT SERPL-MCNC: 1.02 MG/DL (ref 0.7–1.3)
EOSINOPHIL # BLD AUTO: 0.4 THOUSAND/ΜL (ref 0–0.61)
EOSINOPHIL NFR BLD AUTO: 6 % (ref 0–5)
ERYTHROCYTE [DISTWIDTH] IN BLOOD BY AUTOMATED COUNT: 14.1 % (ref 11.5–14.5)
GFR SERPL CREATININE-BSD FRML MDRD: 93 ML/MIN/1.73SQ M
GLUCOSE SERPL-MCNC: 109 MG/DL (ref 65–99)
HCT VFR BLD AUTO: 32.5 % (ref 36.5–49.3)
HGB BLD-MCNC: 10.6 G/DL (ref 14–18)
LYMPHOCYTES # BLD AUTO: 2.6 THOUSANDS/ΜL (ref 0.6–4.47)
LYMPHOCYTES NFR BLD AUTO: 33 % (ref 21–51)
MCH RBC QN AUTO: 30 PG (ref 26–34)
MCHC RBC AUTO-ENTMCNC: 32.7 G/DL (ref 31–37)
MCV RBC AUTO: 92 FL (ref 81–99)
MONOCYTES # BLD AUTO: 0.8 THOUSAND/ΜL (ref 0.17–1.22)
MONOCYTES NFR BLD AUTO: 11 % (ref 2–12)
NEUTROPHILS # BLD AUTO: 3.9 THOUSANDS/ΜL (ref 1.4–6.5)
NEUTS SEG NFR BLD AUTO: 50 % (ref 42–75)
NRBC BLD AUTO-RTO: 0 /100 WBCS
PLATELET # BLD AUTO: 228 THOUSANDS/UL (ref 149–390)
PMV BLD AUTO: 8.6 FL (ref 8.6–11.7)
POTASSIUM SERPL-SCNC: 3.8 MMOL/L (ref 3.5–5.5)
RBC # BLD AUTO: 3.54 MILLION/UL (ref 4.3–5.9)
SODIUM SERPL-SCNC: 137 MMOL/L (ref 134–143)
WBC # BLD AUTO: 7.8 THOUSAND/UL (ref 4.8–10.8)

## 2018-11-17 PROCEDURE — 85025 COMPLETE CBC W/AUTO DIFF WBC: CPT | Performed by: HOSPITALIST

## 2018-11-17 PROCEDURE — 99232 SBSQ HOSP IP/OBS MODERATE 35: CPT | Performed by: INTERNAL MEDICINE

## 2018-11-17 PROCEDURE — 80048 BASIC METABOLIC PNL TOTAL CA: CPT | Performed by: HOSPITALIST

## 2018-11-17 RX ORDER — HYDROMORPHONE HCL/PF 1 MG/ML
1 SYRINGE (ML) INJECTION EVERY 6 HOURS PRN
Status: DISCONTINUED | OUTPATIENT
Start: 2018-11-17 | End: 2018-11-18 | Stop reason: HOSPADM

## 2018-11-17 RX ADMIN — HYDROMORPHONE HYDROCHLORIDE 1 MG: 1 INJECTION, SOLUTION INTRAMUSCULAR; INTRAVENOUS; SUBCUTANEOUS at 20:58

## 2018-11-17 RX ADMIN — OXYCODONE HYDROCHLORIDE AND ACETAMINOPHEN 2 TABLET: 5; 325 TABLET ORAL at 18:40

## 2018-11-17 RX ADMIN — RIVAROXABAN 15 MG: 15 TABLET, FILM COATED ORAL at 16:15

## 2018-11-17 RX ADMIN — ENOXAPARIN SODIUM 90 MG: 100 INJECTION SUBCUTANEOUS at 08:31

## 2018-11-17 RX ADMIN — HYDROMORPHONE HYDROCHLORIDE 2 MG: 2 INJECTION INTRAMUSCULAR; INTRAVENOUS; SUBCUTANEOUS at 05:10

## 2018-11-17 RX ADMIN — TRAZODONE HYDROCHLORIDE 50 MG: 50 TABLET ORAL at 21:00

## 2018-11-17 RX ADMIN — OXYCODONE HYDROCHLORIDE AND ACETAMINOPHEN 2 TABLET: 5; 325 TABLET ORAL at 10:33

## 2018-11-17 RX ADMIN — KETOROLAC TROMETHAMINE 30 MG: 30 INJECTION, SOLUTION INTRAMUSCULAR at 23:05

## 2018-11-17 RX ADMIN — SODIUM CHLORIDE 100 ML/HR: 9 INJECTION, SOLUTION INTRAVENOUS at 08:45

## 2018-11-17 RX ADMIN — KETOROLAC TROMETHAMINE 30 MG: 30 INJECTION, SOLUTION INTRAMUSCULAR at 00:17

## 2018-11-17 RX ADMIN — KETOROLAC TROMETHAMINE 30 MG: 30 INJECTION, SOLUTION INTRAMUSCULAR at 18:40

## 2018-11-17 RX ADMIN — HYDROMORPHONE HYDROCHLORIDE 2 MG: 2 INJECTION INTRAMUSCULAR; INTRAVENOUS; SUBCUTANEOUS at 11:50

## 2018-11-17 RX ADMIN — OLANZAPINE 5 MG: 5 TABLET, FILM COATED ORAL at 21:00

## 2018-11-17 RX ADMIN — HYDROMORPHONE HYDROCHLORIDE 2 MG: 2 INJECTION INTRAMUSCULAR; INTRAVENOUS; SUBCUTANEOUS at 08:31

## 2018-11-17 RX ADMIN — HYDROMORPHONE HYDROCHLORIDE 2 MG: 2 INJECTION INTRAMUSCULAR; INTRAVENOUS; SUBCUTANEOUS at 01:27

## 2018-11-17 RX ADMIN — KETOROLAC TROMETHAMINE 30 MG: 30 INJECTION, SOLUTION INTRAMUSCULAR at 11:49

## 2018-11-17 RX ADMIN — KETOROLAC TROMETHAMINE 30 MG: 30 INJECTION, SOLUTION INTRAMUSCULAR at 05:31

## 2018-11-17 RX ADMIN — NICOTINE 1 PATCH: 21 PATCH, EXTENDED RELEASE TRANSDERMAL at 21:00

## 2018-11-17 NOTE — ASSESSMENT & PLAN NOTE
· Case discussed with Pulmonary - all antibiotics have been stopped for this initial suspected pneumonia  · Pneumonia ruled out per pulmonology  · Procalcitonin is within normal limits  · White blood cell count is within normal limit  · Continue supportive therapy only at this time

## 2018-11-17 NOTE — PROGRESS NOTES
Progress Note - Jose De Jesus Zhang 1980, 41 y o  male MRN: [id number]    Unit/Bed#: -01 Encounter: [id number]    Primary Care Provider: No primary care provider on file  Date and time admitted to hospital: 11/15/2018  6:04 PM        Acute pulmonary embolism ([facility] )   Assessment & Plan    · Continue Xarelto  · Patient appears to be comfortable at this time, sitting up in bed watching TV  No objective findings of severe pain  · Low reduced Dilaudid dose to 1 mg every 6 hours     * Pneumonia due to infectious organism   Assessment & Plan    · Case discussed with Pulmonary - all antibiotics have been stopped for this initial suspected pneumonia  · Pneumonia ruled out per pulmonology  · Procalcitonin is within normal limits  · White blood cell count is within normal limit  · Continue supportive therapy only at this time     Acute respiratory insufficiency   Assessment & Plan    · Currently has significantly improved  · Case discussed with pulmonary Services  · This is all secondary to pulmonary embolisms and pulmonary infarcts  · Antibiotics have been discontinued by Pulmonary Services for the initial suspected pneumonia     Bipolar affective disorder, depressed ([facility] )   Assessment & Plan    · Continue home medications       VTE Pharmacologic Prophylaxis: Pharmacologic: Apixaban (Eliquis)    Patient Centered Rounds: I have performed bedside rounds with nursing staff today  Discussions with Specialists or Other Care Team Provider: yes  Education and Discussions with Family / Patient: yes    Time Spent for Care: 30 minutes  More than 50% of total time spent on counseling and coordination of care as described above      Current Length of Stay: 2 day(s)    Current Patient Status: Inpatient   Certification Statement: The patient will continue to require additional inpatient hospital stay due to PE    Discharge Plan:   Pending hospital course    Code Status: Level 1 - Full Code    Subjective:   Still with intermittent right-sided chest pain  No fever or chills  No nausea or vomiting  Tolerating diet  Ambulating well  Objective:     Vitals:   Temp (24hrs), Av 8 °F (37 1 °C), Min:97 2 °F (36 2 °C), Max:99 7 °F (37 6 °C)    Temp:  [97 2 °F (36 2 °C)-99 7 °F (37 6 °C)] 99 5 °F (37 5 °C)  HR:  [86-97] 86  Resp:  [19-20] 20  BP: ()/(57-78) 110/58  SpO2:  [92 %-96 %] 92 %  Body mass index is 26 38 kg/m²  Input and Output Summary (last 24 hours): Intake/Output Summary (Last 24 hours) at 18 1245  Last data filed at 18 2208   Gross per 24 hour   Intake              720 ml   Output              200 ml   Net              520 ml       Physical Exam:     Physical Exam   Constitutional: He is oriented to person, place, and time  He appears well-developed  No distress  HENT:   Head: Normocephalic and atraumatic  Eyes: Conjunctivae and EOM are normal    Neck: Normal range of motion  Neck supple  Cardiovascular: Normal rate and regular rhythm  Pulmonary/Chest: Effort normal  No respiratory distress  Abdominal: Soft  He exhibits no distension  There is no tenderness  Musculoskeletal: Normal range of motion  He exhibits no edema  Neurological: He is alert and oriented to person, place, and time  No cranial nerve deficit  Skin: Skin is warm and dry  Psychiatric: He has a normal mood and affect         Additional Data:     Labs:      Results from last 7 days  Lab Units 18  0445   WBC Thousand/uL 7 80   HEMOGLOBIN g/dL 10 6*   HEMATOCRIT % 32 5*   PLATELETS Thousands/uL 228   NEUTROS PCT % 50   LYMPHS PCT % 33   MONOS PCT % 11   EOS PCT % 6*       Results from last 7 days  Lab Units 18  0445  18  1712   POTASSIUM mmol/L 3 8  < > 4 2   CHLORIDE mmol/L 103  < > 100   CO2 mmol/L 28  < > 29   BUN mg/dL 10  < > 7   CREATININE mg/dL 1 02  < > 1 04   CALCIUM mg/dL 8 9  < > 10 0   ALK PHOS U/L  --   --  79   ALT U/L  --   --  22   AST U/L  --   --  25   < > = values in this interval not displayed  * I Have Reviewed All Lab Data Listed Above  * Additional Pertinent Lab Tests Reviewed: Shane [facility] Admission  Reviewed    Imaging:  Imaging Reports Reviewed Today Include:   No new imaging    Recent Cultures (last 7 days):       Results from last 7 days  Lab Units 11/15/18  2232 11/14/18  1954 11/14/18  1953   BLOOD CULTURE   --  No Growth at 48 hrs  No Growth at 48 hrs  LEGIONELLA URINARY ANTIGEN  Negative  --   --        Last 24 Hours Medication List:     Current Facility-Administered Medications:  acetaminophen 650 mg Oral Q4H PRN Vicente Pee, PA-C   albuterol 2 puff Inhalation Q4H PRN Tangela Haile MD   enoxaparin 1 mg/kg Subcutaneous Q12H [address] Vicente Pee, PA-C   HYDROmorphone 1 mg Intravenous Q6H PRN Tangela Haile MD   ketorolac 30 mg Intravenous Q6H [address] Claire Sandoval PA-C   LORazepam 1 mg Intravenous Q6H PRN Vicente Pee, PA-C   nicotine 1 patch Transdermal HS ANITA Alvarez-JANELLE   OLANZapine 5 mg Oral HS Claire Sandoval PA-C   ondansetron 4 mg Intravenous Q6H PRN Vicente Pee, PA-C   oxyCODONE-acetaminophen 2 tablet Oral Q8H PRN ERICK Thomas   traZODone 50 mg Oral HS Vicente Pee, PA-C        Today, Patient Was Seen By: Tangela Haile MD    ** Please Note: Dictation voice to text software may have been used in the creation of this document   **

## 2018-11-17 NOTE — ASSESSMENT & PLAN NOTE
· Continue Xarelto  · Patient appears to be comfortable at this time, sitting up in bed watching TV  No objective findings of severe pain    · Low reduced Dilaudid dose to 1 mg every 6 hours

## 2018-11-18 VITALS
RESPIRATION RATE: 16 BRPM | OXYGEN SATURATION: 94 % | HEART RATE: 69 BPM | SYSTOLIC BLOOD PRESSURE: 124 MMHG | HEIGHT: 71 IN | BODY MASS INDEX: 26.48 KG/M2 | DIASTOLIC BLOOD PRESSURE: 74 MMHG | WEIGHT: 189.13 LBS | TEMPERATURE: 97.2 F

## 2018-11-18 PROCEDURE — 99239 HOSP IP/OBS DSCHRG MGMT >30: CPT | Performed by: INTERNAL MEDICINE

## 2018-11-18 RX ADMIN — HYDROMORPHONE HYDROCHLORIDE 1 MG: 1 INJECTION, SOLUTION INTRAMUSCULAR; INTRAVENOUS; SUBCUTANEOUS at 12:36

## 2018-11-18 RX ADMIN — KETOROLAC TROMETHAMINE 30 MG: 30 INJECTION, SOLUTION INTRAMUSCULAR at 05:10

## 2018-11-18 RX ADMIN — KETOROLAC TROMETHAMINE 30 MG: 30 INJECTION, SOLUTION INTRAMUSCULAR at 11:32

## 2018-11-18 RX ADMIN — HYDROMORPHONE HYDROCHLORIDE 1 MG: 1 INJECTION, SOLUTION INTRAMUSCULAR; INTRAVENOUS; SUBCUTANEOUS at 07:54

## 2018-11-18 RX ADMIN — RIVAROXABAN 15 MG: 15 TABLET, FILM COATED ORAL at 07:56

## 2018-11-18 RX ADMIN — OXYCODONE HYDROCHLORIDE AND ACETAMINOPHEN 2 TABLET: 5; 325 TABLET ORAL at 11:36
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
## 2018-11-18 NOTE — ASSESSMENT & PLAN NOTE
· Continue Xarelto  · Patient appears to be comfortable at this time, sitting up in bed watching TV  No objective findings of severe pain    · Follow up with PCP as outpatient
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
## 2018-11-19 LAB
BACTERIA BLD CULT: NORMAL
BACTERIA BLD CULT: NORMAL

## 2018-11-19 NOTE — UTILIZATION REVIEW
Notification of Discharge  This is a Notification of Discharge from our facility [address]  Please be advised that this patient has been discharge from our facility  Below you will find the admission and discharge date and time including the patients disposition  PRESENTATION DATE: 11/15/2018  6:04 PM  IP ADMISSION DATE: 11/15/18 2134  DISCHARGE DATE: 11/18/2018  1:30 PM  DISPOSITION: [id number] [facility] Utilization Review Department  Phone: [phone]; Fax [phone]  ATTENTION: Please call with any questions or concerns to [phone]  and carefully listen to the prompts so that you are directed to the right person  Send all requests for admission clinical reviews, approved or denied determinations and any other requests to fax [phone]   All voicemails are confidential

## 2019-04-21 ENCOUNTER — HOSPITAL ENCOUNTER (EMERGENCY)
Facility: HOSPITAL | Age: 39
Discharge: HOME | End: 2019-04-21
Attending: EMERGENCY MEDICINE | Admitting: EMERGENCY MEDICINE
Payer: COMMERCIAL

## 2019-04-21 ENCOUNTER — APPOINTMENT (EMERGENCY)
Dept: CT IMAGING | Facility: HOSPITAL | Age: 39
End: 2019-04-21
Payer: COMMERCIAL

## 2019-04-21 VITALS
TEMPERATURE: 97.4 F | SYSTOLIC BLOOD PRESSURE: 120 MMHG | BODY MASS INDEX: 23.8 KG/M2 | WEIGHT: 170 LBS | HEIGHT: 71 IN | OXYGEN SATURATION: 95 % | HEART RATE: 94 BPM | DIASTOLIC BLOOD PRESSURE: 61 MMHG | RESPIRATION RATE: 20 BRPM

## 2019-04-21 DIAGNOSIS — R10.84 GENERALIZED ABDOMINAL PAIN: Primary | ICD-10-CM

## 2019-04-21 PROCEDURE — 74176 CT ABD & PELVIS W/O CONTRAST: CPT

## 2019-04-21 PROCEDURE — 99284 EMERGENCY DEPT VISIT MOD MDM: CPT

## 2019-11-30 NOTE — UTILIZATION REVIEW
Notification of Discharge  This is a Notification of Discharge from our facility [address]  Please be advised that this patient has been discharge from our facility  Below you will find the admission and discharge date and time including the patients disposition  PRESENTATION DATE: 11/14/2018 10:12 PM  IP ADMISSION DATE: 11/14/18 2212  DISCHARGE DATE: 11/15/2018  3:43 PM  DISPOSITION: [id number] [facility] Utilization Review Department  Phone: [phone]; Fax [phone]  ATTENTION: Please call with any questions or concerns to [phone]  and carefully listen to the prompts so that you are directed to the right person  Send all requests for admission clinical reviews, approved or denied determinations and any other requests to fax [phone]   All voicemails are confidential  Tried to call report to [email or web address] at [facility], left message.
